# Patient Record
Sex: MALE | Race: WHITE | Employment: FULL TIME | URBAN - METROPOLITAN AREA
[De-identification: names, ages, dates, MRNs, and addresses within clinical notes are randomized per-mention and may not be internally consistent; named-entity substitution may affect disease eponyms.]

---

## 2020-06-23 ENCOUNTER — TELEPHONE (OUTPATIENT)
Dept: UROLOGY | Age: 45
End: 2020-06-23

## 2020-06-24 ENCOUNTER — HOSPITAL ENCOUNTER (OUTPATIENT)
Dept: ULTRASOUND IMAGING | Age: 45
Discharge: HOME | End: 2020-06-26
Payer: COMMERCIAL

## 2020-06-24 PROCEDURE — 76870 US EXAM SCROTUM: CPT

## 2020-06-30 ENCOUNTER — OFFICE VISIT (OUTPATIENT)
Dept: UROLOGY | Age: 45
End: 2020-06-30
Payer: COMMERCIAL

## 2020-06-30 VITALS
DIASTOLIC BLOOD PRESSURE: 76 MMHG | SYSTOLIC BLOOD PRESSURE: 112 MMHG | HEIGHT: 70 IN | WEIGHT: 160 LBS | HEART RATE: 93 BPM | BODY MASS INDEX: 22.9 KG/M2

## 2020-06-30 PROCEDURE — 99203 OFFICE O/P NEW LOW 30 MIN: CPT | Performed by: UROLOGY

## 2020-06-30 NOTE — PROGRESS NOTES
patient face-to-face  No orders of the defined types were placed in this encounter. No orders of the defined types were placed in this encounter. Richy Mueller MD       Please note this report has been partially produced using speech recognition software  And may cause contain errors related to that system including grammar, punctuation and spelling as well as words and phrases that may seem inappropriate. If there are questions or concerns please feel free to contact me to clarify.

## 2022-07-28 ENCOUNTER — HOSPITAL ENCOUNTER (OUTPATIENT)
Dept: PHYSICAL THERAPY | Age: 47
Setting detail: THERAPIES SERIES
Discharge: HOME | End: 2022-07-28
Payer: COMMERCIAL

## 2022-07-28 PROCEDURE — 97110 THERAPEUTIC EXERCISES: CPT

## 2022-07-28 PROCEDURE — 97161 PT EVAL LOW COMPLEX 20 MIN: CPT

## 2022-07-28 ASSESSMENT — PAIN DESCRIPTION - PAIN TYPE: TYPE: ACUTE PAIN

## 2022-07-28 ASSESSMENT — PAIN DESCRIPTION - LOCATION: LOCATION: SHOULDER

## 2022-07-28 ASSESSMENT — PAIN DESCRIPTION - DESCRIPTORS: DESCRIPTORS: SHARP;STABBING

## 2022-07-28 ASSESSMENT — PAIN DESCRIPTION - ORIENTATION: ORIENTATION: LEFT;UPPER

## 2022-07-28 ASSESSMENT — PAIN DESCRIPTION - ONSET: ONSET: SUDDEN

## 2022-07-28 ASSESSMENT — PAIN SCALES - GENERAL: PAINLEVEL_OUTOF10: 4

## 2022-07-28 ASSESSMENT — PAIN DESCRIPTION - FREQUENCY: FREQUENCY: INTERMITTENT

## 2022-07-28 ASSESSMENT — PAIN - FUNCTIONAL ASSESSMENT: PAIN_FUNCTIONAL_ASSESSMENT: PREVENTS OR INTERFERES WITH ALL ACTIVE AND SOME PASSIVE ACTIVITIES

## 2022-07-28 NOTE — PROGRESS NOTES
[address]  PHYSICAL THERAPY PLAN OF CARE   [address], [address] [facility]         Ph: [phone]  Fax: [phone]    [] Certification  [] Recertification [x]  Plan of Care  [] Progress Note [] Discharge      Referring Provider: Prabha Garcia MD     From:  Shahnaz Kevin, PT , DPT  Patient: Kaylene Figueroa [de-identified]50 y.o. male) : 1975 Date: 2022  Medical Diagnosis: Unspecified sprain of unspecified shoulder joint, initial encounter [U20.789J]    Treatment Diagnosis: L shoulder pain, decreased L shoulder A/PROM, decreased L UE strength, decreased posture/shoulder positioning/biomechanics, and decreased functional activity tolerance    Progress Report Period from:  2022  to 2022    Visits to Date: 1 No Show: 0 Cancelled Appts: 0    OBJECTIVE:   Short Term Goals - Time Frame for Short term goals: 3 weeks    Goals Current/Discharge status  Status   Short term goal 1: The pt will demonstrate improved postural awareness requiring <25% VC's with exercises  fair New   Short term goal 2: The pt will demo improved L shoulder PROM WNL's all planes in order to increase ease with active ADL's  PROM LUE (degrees)  L Shoulder Flex  0-170: 137  L Shoulder ABduction 0-140: 105  L Shoulder Int Rotation  0-70: 70  L Shoulder Ext Rotation  0-90: 65 New     Long Term Goals - Time Frame for Long term goals : 6 weeks  Goals Current/ Discharge status Status   Long term goal 1: The pt will demo improved L shoulder AROM WNL's all planes in order to increase ease with ADL's AROM LUE (degrees)  L Shoulder Flexion 0-180: 40 before increased pain  L Shoulder ABduction 0-180: 45 before increased pain  L Shoulder Int Rotation  0-70: L1  L Shoulder Ext Rotation  0-90: ear- unable to reach midline  New   Long term goal 2: The pt will demonstrate improved L UE strength 5/5 in order to lift/carry with decreased pain Strength LUE  L Shoulder Flexion: 2+/5  L Shoulder ABduction: 2+/5  L Shoulder Internal Rotation: 2+/5  L Shoulder External Rotation: 2+/5  L Elbow Flexion: 5/5  L Elbow Extension: 4+/5 (pain)   New   Long term goal 3: The pt will have an increase in UEFI score >/=75/80 points in order to increase functional activity tolerance Exam: UEFI 40/80   New   Long term goal 4: The pt will be indep/compliant with HEP in order to self-manage symptoms upon D/C ongoing New     Body Structures, Functions, Activity Limitations Requiring Skilled Therapeutic Intervention: Increased pain, Decreased ADL status, Decreased ROM, Decreased strength, Decreased high-level IADLs  Assessment: Pt presents with onset of L shoulder pain after falling while running on 7/3/22 landing on his L UE. He currently presents with decreased L shoulder A/PROM, decreased L UE strength, decreased posture/shoulder positioning/biomechanics, and decreased functional activity tolerance. Unable to further assess special tests this date d/t pain, limited mobility, and guarding though pt with recent MRI and states there is no RTC tear. These impairments currently limit his fucntional abilities to perform ADL's, household duties, work related duties, overhead activities, reach, lift, carry, or sleep without increased pain or limitations at PLOF. Therapy Prognosis: Excellent    PT Education: Goals;PT Role;Plan of Care;Evaluative findings;Home Exercise Program;Anatomy of condition    PLAN: [x] Evaluate and Treat  Frequency/Duration:  Plan Frequency: 2-3  Plan weeks: 6  Current Treatment Recommendations: Strengthening, ROM, Manual Therapy - Soft Tissue Mobilization, Neuromuscular re-education, Manual Therapy - Joint Manipulation, Pain management, Home exercise program, Return to work related activity, Patient/Caregiver education & training, Modalities, Positioning, Integrated dry needling     Precautions: C6-C7 Fusion                       Patient Status:[x] Continue/ Initiate plan of Care    [] Discharge PT. Recommend pt continue with HEP.      [] Additional visits requested, Please re-certify for additional visits:    [] Hold         Signature: Electronically signed by Arslan Goldberg PT on 7/28/22 at 9:08 AM EDT    If you have any questions or concerns, please don't hesitate to call. Thank you for your referral.    I have reviewed this plan of care and certify a need for medically necessary rehabilitation services.     Physician Signature:__________________________________________________________  Date:  Please sign and return

## 2022-07-28 NOTE — PROGRESS NOTES
[facility]  PHYSICAL THERAPY EVALUATION      Physical Therapy: Initial Evaluation    Patient: aFvian Santiago [de-identified]50 y.o.     male)   Examination Date:   Plan of Care Certification Period: 2022 to    Progress Note Counter:    :  1975 ;    Confirmed: Yes MRN: [id number]  CSN: [id number]   Insurance: Payor: [facility] / Plan: [facility] - [place] PPO / Product Type: *No Product type* /   Insurance ID: [id number] - [facility] Secondary Insurance (if applicable):    Referring Physician: Maximo Stack MD     PCP: No primary care provider on file. Visits to Date/Visits Approved:     No Show/Cancelled Appts: 0      Medical Diagnosis: Unspecified sprain of unspecified shoulder joint, initial encounter [S43.409A]    Treatment Diagnosis: L shoulder pain, decreased L shoulder A/PROM, decreased L UE strength, decreased posture/shoulder positioning/biomechanics, and decreased functional activity tolerance     PERTINENT MEDICAL HISTORY   Patient Assessed for Rehabilitation Services: Yes       Medical History: Chart Reviewed: Yes No past medical history on file. Surgical History:   Past Surgical History:   Procedure Laterality Date    THORACIC SPINE SURGERY      C7       Medications: No current outpatient medications on file. Allergies: Patient has no known allergies. SUBJECTIVE EXAMINATION      ,      Family/Caregiver Present: No    Subjective History: Onset Date: 22  Subjective: Pt reports he was running a race on 7/3/22 and fell landing on his L UE resulting in L shoulder pain and multiple abrasions throughout L UE. Went to the ER the following day d/t shoulder pain. Had xrays taken. Saw ortho who took MRI and said there is \"fluid\" around L shoulder, reports (-) for RTC tear. Ortho referred him to PT and takes motrin PRN. Pt reports diffciulty lifting L UE without \"severe\" pain.  Pt reports he was in a sling and that helps to alleviate the pain though he has been trying to go witout it as much as tolerated.   Additional Pertinent Hx (if applicable): PMH O6-S8 fusion   Prior diagnostic testing[de-identified] MRI, X-ray  Previous treatments prior to current episode?: Medications  Comment: RTD unsure      Learning/Language: Learning  Does the patient/guardian have any barriers to learning?: No barriers  Will there be a co-learner?: No  What is the preferred language of the patient/guardian?: English  Is an  required?: No     Pain Screening    Pain Screening  Patient Currently in Pain: Yes  Pain Assessment: 0-10  Pain Level: 4  Best Pain Level: 0  Worst Pain Level: 10  Pain Type: Acute pain  Pain Location: Shoulder  Pain Orientation: Left, Upper  Pain Descriptors: Sharp, Stabbing  Pain Frequency: Intermittent  Pain Onset: Sudden  Functional Pain Assessment: Prevents or interferes with all active and some passive activities  Aggravating factors: Reaching, Movement, Sitting, Laying on involved side, Sleeping, Lifting, Carrying, Supine lying  Pain Management/Relieving Factors: Medications, Ice, Heat    Functional Status    Social History:    Social History  Lives With: Spouse    Occupation/Interests:   Occupation: Full time employment  Type of Occupation: [employer] sons [employer] not physically working  Leisure & Hobbies: running, golfing, bowling    Prior Level of Function:   100% Independent      Current Level of Function:   </=20%      IADL Comments: increased time required to perform bathing and dressing  ADL Assistance: Independent  Homemaking Assistance: Independent  Homemaking Responsibilities: Yes  Ambulation Assistance: Independent  Transfer Assistance: Independent  Active : Yes    Dominant Hand: : Right    OBJECTIVE EXAMINATION     Review of Systems:  Follows Commands: Within Functional Limits    Observations:   General Observations  Description: rounded shoulders, L shoulder elavation, scap winging, scap abduction    Left AROM Right AROM         AROM LUE (degrees)  L Shoulder Flexion 0-180: 40 before increased pain  L Shoulder ABduction 0-180: 45 before increased pain  L Shoulder Int Rotation  0-70: L1  L Shoulder Ext Rotation  0-90: ear- unable to reach midline    AROM RUE (degrees)  R Shoulder Flexion 0-180: 170  R Shoulder ABduction 0-180: 170  R Shoulder Int Rotation  0-70: T10  R Shoulder Ext Rotation 0-90: T5      Left PROM  Right PROM         PROM LUE (degrees)  L Shoulder Flex  0-170: 137  L Shoulder ABduction 0-140: 105  L Shoulder Int Rotation  0-70: 70  L Shoulder Ext Rotation  0-90: 65           Left Strength  Right Strength         Strength LUE  L Shoulder Flexion: 2+/5  L Shoulder ABduction: 2+/5  L Shoulder Internal Rotation: 2+/5  L Shoulder External Rotation: 2+/5  L Elbow Flexion: 5/5  L Elbow Extension: 4+/5 (pain)    Strength RUE  R Shoulder Flexion: 5/5  R Shoulder ABduction: 5/5  R Shoulder Internal Rotation: 5/5  R Shoulder External Rotation: 5/5  R Elbow Flexion: 5/5  R Elbow Extension: 5/5     Outcomes Score:  Exam: UEFI 40/80    Treatment:    Exercises:   Exercises  Exercise 1: pendulums 4 way x10 ea. Exercise 2: pulleys*  Exercise 3: table slides*  Exercise 4: scap retract*  Exercise 5: supine wand*  Exercise 7: PROM: L shoulder flex, abd, ER x10 mins  Exercise 8: HEP: pendulums  Treatment Reasoning  Limitations addressed: Mobility  Modalities:  Modalities  Modalities:  (MHP/CP/U/S/IFC PRN*)     Manual:  Manual Therapy  Joint Mobilization: GH and scapular mobs*  Soft Tissue Mobilizaton: L periscapulars, LS, lats, bicep*     *Indicates exercise,modality, or manual techniques to be initiated when appropriate    ASSESSMENT     Impression: Assessment: Pt presents with onset of L shoulder pain after falling while running on 7/3/22 landing on his L UE.  He currently presents with decreased L shoulder A/PROM, decreased L UE strength, decreased posture/shoulder positioning/biomechanics, and decreased functional activity TREATMENT PLAN       Requires PT Follow-Up: Yes  Treatment Initiated : ther ex, PROM, HEP    Treatment may include any combination of the following: Strengthening, ROM, Manual Therapy - Soft Tissue Mobilization, Neuromuscular re-education, Manual Therapy - Joint Manipulation, Pain management, Home exercise program, Return to work related activity, Patient/Caregiver education & training, Modalities, Positioning, Integrated dry needling     Frequency / Duration:  Patient to be seen 2-3 times per week for 6 weeks      Eval Complexity:   Decision Making: Low Complexity  History: Personal Factors and/or Comorbidities Impacting POC: Low  Examination of body system(s) including body structures and functions, activity limitations, and/or participation restrictions: High  Exam: UEFI 40/80  Clinical Presentation: Medium    POST-PAIN     Pain Rating (0-10 pain scale):  5 /10  Location and pain description same as pre-treatment unless indicated. Action: [] NA  [] Call Physician  [x] Perform HEP  [x] Meds as prescribed    Evaluation and patient rights have been reviewed and patient agrees with plan of care. Yes  [x]  No  []   Explain:     Paresh Fall Risk Assessment  Risk Factor Scale  Score   History of Falls [x] Yes  [] No 25  0 25   Secondary Diagnosis [] Yes  [x] No 15  0    Ambulatory Aid [] Furniture  [] Crutches/cane/walker  [x] None/bedrest/wheelchair/nurse 30  15  0    IV/Heparin Lock [] Yes  [x] No 20  0    Gait/Transferring [] Impaired  [] Weak  [x] Normal/bedrest/immobile 20  10  0    Mental Status [] Forgets limitations  [x] Oriented to own ability 15  0       Total: 25     Based on the Assessment score: check the appropriate box.   []  No intervention needed   Low =   Score of 0-24  [x]  Use standard prevention interventions Moderate =  Score of 24-44   [x] Discuss fall prevention strategies   [x] Indicate moderate falls risk on eval  []  Use high risk prevention interventions High = Score of 45 and higher   [] Discuss fall prevention strategies   [] Provide supervision during treatment time      Minutes:  PT Individual Minutes  Time In: 0815  Time Out: 9895  Minutes: 34  Timed Code Treatment Minutes: 15 Minutes  Procedure Minutes: 19     Timed Activity Minutes Units   Ther Ex 15 1     Electronically signed by Lynne Sexton PT on 7/28/22 at 9:12 AM EDT

## 2022-08-01 ENCOUNTER — HOSPITAL ENCOUNTER (OUTPATIENT)
Dept: PHYSICAL THERAPY | Age: 47
Setting detail: THERAPIES SERIES
Discharge: HOME | End: 2022-08-01
Payer: COMMERCIAL

## 2022-08-01 PROCEDURE — 97110 THERAPEUTIC EXERCISES: CPT

## 2022-08-01 ASSESSMENT — PAIN SCALES - GENERAL: PAINLEVEL_OUTOF10: 6

## 2022-08-01 ASSESSMENT — PAIN DESCRIPTION - ORIENTATION: ORIENTATION: LEFT;UPPER

## 2022-08-01 ASSESSMENT — PAIN DESCRIPTION - DESCRIPTORS: DESCRIPTORS: SHARP;STABBING

## 2022-08-01 ASSESSMENT — PAIN DESCRIPTION - FREQUENCY: FREQUENCY: INTERMITTENT

## 2022-08-01 ASSESSMENT — PAIN DESCRIPTION - LOCATION: LOCATION: SHOULDER

## 2022-08-01 ASSESSMENT — PAIN DESCRIPTION - PAIN TYPE: TYPE: ACUTE PAIN

## 2022-08-01 NOTE — PROGRESS NOTES
[address] [facility]  Outpatient Physical Therapy    Treatment Note        Date: 2022  Patient: Matteo Weaver  : 1975   Confirmed: Yes  MRN: [id number]  Referring Provider: Sho Sousa MD   Secondary Referring Provider (If applicable):     Medical Diagnosis: Unspecified sprain of unspecified shoulder joint, initial encounter [P45.927Y]    Treatment Diagnosis: L shoulder pain, decreased L shoulder A/PROM, decreased L UE strength, decreased posture/shoulder positioning/biomechanics, and decreased functional activity tolerance    Visit Information:  Insurance: Payor: [facility] / Plan: [employer] - [place] PPO / Product Type: *No Product type* /   PT Visit Information  Onset Date: 22  Total # of Visits Approved: 75  Total # of Visits to Date: 2  No Show: 0  Canceled Appointment: 0  Progress Note Counter: -    Subjective Information:  Subjective: Pt reports throbbing pain when waking up pt believes it is dt being a wild sleeper. reports occasionally rolls on while sleeping. inquired about best way to prevent. pt reports pain alleviates over time dropping to 1-2 which is managed with moltrin  HEP Compliance:  [x] Good [] Fair [] Poor [] Reports not doing due to:    Pain Screening  Patient Currently in Pain: Yes  Pain Level: 6  Pain Type: Acute pain  Pain Location: Shoulder  Pain Orientation: Left, Upper  Pain Descriptors: Sharp, Stabbing  Pain Frequency: Intermittent    Treatment:  Exercises:  Exercises  Exercise 1: pendulums 4 way x10 ea. Exercise 2: pulleys 4' flexion  Exercise 3: table slides x 10  Exercise 4: scap retract x 15 x 5\" holds increased cuing to remove shoulder muscle activation. Exercise 5: supine wand Flexion  Exercise 7: PROM: L shoulder flex, abd, ER x10 mins  Exercise 8: HEP: pendulums  Treatment Reasoning  Limitations addressed:  Mobility    *Indicates exercise, modality, or manual techniques to be initiated when appropriate    Objective Measures:       Strength: [x] NT  [] MMT completed:      ROM: [x] NT  [] ROM measurements:    Balance: [x] NT       Observations: [x] NT       Assessment: Body Structures, Functions, Activity Limitations Requiring Skilled Therapeutic Intervention: Increased pain, Decreased ADL status, Decreased ROM, Decreased strength, Decreased high-level IADLs  Assessment: PT reports with increased shoulder pain this morning dt sleep but was able to tolerate newly introduced activites. pt reports occasional pain in the posterior shoulder cued to stop  just before pain pt demo'd fair to good carry over. Pt requires cuing for staying within pain free range. Pt reported significant improvement in pain following tx Educated pt on positional awareness while in bed and placing self in positions to inhibit rolling onto shoulder look to discuss further NV  Treatment Diagnosis: L shoulder pain, decreased L shoulder A/PROM, decreased L UE strength, decreased posture/shoulder positioning/biomechanics, and decreased functional activity tolerance  Therapy Prognosis: Excellent       Patient Education: [x] See above     Post-Pain Assessment:       Pain Rating (0-10 pain scale):   1/10   Location and pain description same as pre-treatment unless indicated. Action: [] NA   [x] Perform HEP  [] Meds as prescribed  [] Modalities as prescribed   [] Call Physician     GOALS   Patient Goal(s): Patient goals :  \"To stop the pain\"    Short Term Goals Completed by 3 weeks Goal Status   STG 1 The pt will demonstrate improved postural awareness requiring <25% VC's with exercises In progress   STG 2 The pt will demo improved L shoulder PROM WNL's all planes in order to increase ease with active ADL's In progress     Long Term Goals Completed by 6 weeks Goal Status   LTG 1 The pt will demo improved L shoulder AROM WNL's all planes in order to increase ease with ADL's In progress   LTG 2 The pt will demonstrate improved L UE strength 5/5 in order to lift/carry with decreased pain In progress LTG 3 The pt will have an increase in UEFI score >/=75/80 points in order to increase functional activity tolerance In progress   LTG 4 The pt will be indep/compliant with HEP in order to self-manage symptoms upon D/C In progress            Plan:  Frequency/Duration:  Plan  Plan Frequency: 2-3  Plan weeks: 6  Current Treatment Recommendations: Strengthening, ROM, Manual Therapy - Soft Tissue Mobilization, Neuromuscular re-education, Manual Therapy - Joint Manipulation, Pain management, Home exercise program, Return to work related activity, Patient/Caregiver education & training, Modalities, Positioning, Integrated dry needling  Pt to continue current HEP. See objective section for any therapeutic exercise changes, additions or modifications this date.     Therapy Time:      PT Individual Minutes  Time In: 0900  Time Out: 3807  Minutes: 55     Procedure Minutes: 0  Timed Activity Minutes Units   Ther Ex 55 4     Electronically signed by Leon Helton PTA on 8/1/22 at 9:58 AM EDT

## 2022-08-03 ENCOUNTER — HOSPITAL ENCOUNTER (OUTPATIENT)
Dept: PHYSICAL THERAPY | Age: 47
Setting detail: THERAPIES SERIES
Discharge: HOME | End: 2022-08-03
Payer: COMMERCIAL

## 2022-08-03 PROCEDURE — 97110 THERAPEUTIC EXERCISES: CPT

## 2022-08-03 ASSESSMENT — PAIN DESCRIPTION - ORIENTATION: ORIENTATION: LEFT

## 2022-08-03 ASSESSMENT — PAIN DESCRIPTION - LOCATION: LOCATION: SHOULDER

## 2022-08-03 ASSESSMENT — PAIN SCALES - GENERAL: PAINLEVEL_OUTOF10: 6

## 2022-08-03 ASSESSMENT — PAIN DESCRIPTION - DESCRIPTORS: DESCRIPTORS: SORE

## 2022-08-03 NOTE — PROGRESS NOTES
[address] [facility]  Outpatient Physical Therapy    Treatment Note        Date: 8/3/2022  Patient: Raul Weaver  : 1975   Confirmed: Yes  MRN: [id number]  Referring Provider: Hamzah Salas MD   Secondary Referring Provider (If applicable):     Medical Diagnosis: Unspecified sprain of unspecified shoulder joint, initial encounter [L59.609S]    Treatment Diagnosis: L shoulder pain, decreased L shoulder A/PROM, decreased L UE strength, decreased posture/shoulder positioning/biomechanics, and decreased functional activity tolerance    Visit Information:  Insurance: Payor: Maury Garcia / Plan: [facility] - [place] PPO / Product Type: *No Product type* /   PT Visit Information  Onset Date: 22  Total # of Visits Approved: 75  Total # of Visits to Date: 3  No Show: 0  Canceled Appointment: 0  Progress Note Counter: 3/8-    Subjective Information:  Subjective: Pt reports feeling very sore this AM, reports it is sore every morning. Felt \"pretty good\" following LV. HEP Compliance:  [x] Good [] Fair [] Poor [] Reports not doing due to:    Pain Screening  Patient Currently in Pain: Yes  Pain Assessment: 0-10  Pain Level: 6  Pain Location: Shoulder  Pain Orientation: Left  Pain Descriptors: Sore    Treatment:  Exercises:  Exercises  Exercise 2: pulleys 4' flexion  Exercise 3: table slides 5\" x 10  Exercise 4: scap retract x 15 x 5\"  Exercise 5: supine wand Flexion, ER, scaption 5\"x10  Exercise 6: L UT stretch 30\"x3  Exercise 7: PROM: L shoulder flex, abd, ER, IR (ER/IR in varying degrees of abduction with humeral head blocking) x10 mins  Exercise 8: HEP: table slides, UT stretch  Treatment Reasoning  Limitations addressed:  Mobility, Posture    Manual:   Manual Therapy  Joint Mobilization: GH inferior joint mobs grade II-III at end range abduction, long axis pdistraction throughout PROM in all planes     Modalities:  Declined, will perform at home     *Indicates exercise, modality, or manual techniques to be initiated when appropriate    Objective Measures:     Strength: [x] NT  [] MMT completed:      ROM: [] NT  [x] ROM measurements:  AROM LUE (degrees)  L Shoulder Flexion 0-180: 150 with painful arc from 0-45 degrees and pain at end range  L Shoulder ABduction 0-180: 75 pain from 45-75degrees  L Shoulder Ext Rotation  0-90: base of occiput with pain     Balance: [x] NT     Observations: [x] NT     Assessment: Body Structures, Functions, Activity Limitations Requiring Skilled Therapeutic Intervention: Increased pain, Decreased ADL status, Decreased ROM, Decreased strength, Decreased high-level IADLs  Assessment: Pt progressing well towards L shoulder AROM despite having ongoing pain espeically at initiation of motions. Pt demos near full PROM in flexion/abduction as well as improving IR/ER with firm end feels in varying degrees of abduction. Pt conts to require VC's to perform exercises in non-pain producing ROM with good follow through. Pt also required intermittent VC/TC's to decrease UT compensations with good carryover. Treatment Diagnosis: L shoulder pain, decreased L shoulder A/PROM, decreased L UE strength, decreased posture/shoulder positioning/biomechanics, and decreased functional activity tolerance  Therapy Prognosis: Excellent     Patient Education: [x] NA     Post-Pain Assessment:       Pain Rating (0-10 pain scale):   3/10   Location and pain description same as pre-treatment unless indicated. Action: [] NA   [x] Perform HEP  [] Meds as prescribed  [] Modalities as prescribed   [] Call Physician     GOALS   Patient Goal(s): Patient goals :  \"To stop the pain\"    Short Term Goals Completed by 3 weeks Goal Status   STG 1 The pt will demonstrate improved postural awareness requiring <25% VC's with exercises In progress   STG 2 The pt will demo improved L shoulder PROM WNL's all planes in order to increase ease with active ADL's In progress     Long Term Goals Completed by 6 weeks Goal Status   LTG 1 The pt will demo improved L shoulder AROM WNL's all planes in order to increase ease with ADL's In progress   LTG 2 The pt will demonstrate improved L UE strength 5/5 in order to lift/carry with decreased pain In progress   LTG 3 The pt will have an increase in UEFI score >/=75/80 points in order to increase functional activity tolerance In progress   LTG 4 The pt will be indep/compliant with HEP in order to self-manage symptoms upon D/C In progress     Plan:  Frequency/Duration:  Plan  Plan Frequency: 2-3  Plan weeks: 6  Current Treatment Recommendations: Strengthening, ROM, Manual Therapy - Soft Tissue Mobilization, Neuromuscular re-education, Manual Therapy - Joint Manipulation, Pain management, Home exercise program, Return to work related activity, Patient/Caregiver education & training, Modalities, Positioning, Integrated dry needling  Pt to continue current HEP. See objective section for any therapeutic exercise changes, additions or modifications this date.     Therapy Time:   PT Individual Minutes  Time In: 4582  Time Out: 4680  Minutes: 40  Timed Code Treatment Minutes: 40 Minutes  Procedure Minutes: 0  Timed Activity Minutes Units   Ther Ex 40 3     Electronically signed by Irais Rosado PT on 8/3/22 at 8:46 AM EDT

## 2022-08-05 ENCOUNTER — HOSPITAL ENCOUNTER (OUTPATIENT)
Dept: PHYSICAL THERAPY | Age: 47
Setting detail: THERAPIES SERIES
Discharge: HOME | End: 2022-08-05
Payer: COMMERCIAL

## 2022-08-05 PROCEDURE — 97110 THERAPEUTIC EXERCISES: CPT | Performed by: PHYSICAL THERAPIST

## 2022-08-05 ASSESSMENT — PAIN DESCRIPTION - LOCATION: LOCATION: SHOULDER

## 2022-08-05 ASSESSMENT — PAIN SCALES - GENERAL: PAINLEVEL_OUTOF10: 6

## 2022-08-05 ASSESSMENT — PAIN DESCRIPTION - ORIENTATION: ORIENTATION: LEFT

## 2022-08-05 ASSESSMENT — PAIN DESCRIPTION - DESCRIPTORS: DESCRIPTORS: SORE

## 2022-08-05 NOTE — PROGRESS NOTES
[address] [facility]  Outpatient Physical Therapy    Treatment Note        Date: 2022  Patient: Suzanne Weaver  : 1975   Confirmed: Yes  MRN: [id number]  Referring Provider: Liborio Becerra MD   Secondary Referring Provider (If applicable):     Medical Diagnosis: Unspecified sprain of unspecified shoulder joint, initial encounter [F84.632Y]    Treatment Diagnosis: L shoulder pain, decreased L shoulder A/PROM, decreased L UE strength, decreased posture/shoulder positioning/biomechanics, and decreased functional activity tolerance    Visit Information:  Insurance: Payor: Lila [place] / Plan: [employer] - [place] PPO / Product Type: *No Product type* /   PT Visit Information  Onset Date: 22  PT Insurance Information: [employer]  Total # of Visits Approved: 75  Total # of Visits to Date: 4  No Show: 0  Canceled Appointment: 0  Progress Note Counter: -    Subjective Information:  Subjective: Patient reports that his shoulder is more painful in the mornings, and feels better after therapy and throughout his day while moving his shoulder. HEP Compliance:  [x] Good [] Fair [] Poor [] Reports not doing due to:    Pain Screening  Patient Currently in Pain: Yes  Pain Assessment: 0-10  Pain Level: 6  Best Pain Level: 0  Worst Pain Level: 10  Post Treatment Pain Level: 3  Pain Location: Shoulder  Pain Orientation: Left  Pain Descriptors: Sore    Treatment:  Exercises:  Exercises  Exercise 1: pendulums 4 way x10 ea. Exercise 2: pulleys 4' flexion  Exercise 3: table slides 5\" x 10  Exercise 4: scap retract x 15 x 5\"  Exercise 5: supine wand Flexion, ER, scaption 5\"x10  Exercise 6: L UT stretch 30\"x3  Exercise 7: PROM: L shoulder flex, abd, ER, IR (ER/IR in varying degrees of abduction with humeral head blocking) x10 mins  Exercise 9: corner stretch 3 times 20 sec hold  Treatment Reasoning  Limitations addressed:  Mobility, Posture  Therapist provided: Verbal cuing       *Indicates exercise, modality, or manual techniques to be initiated when appropriate    Objective Measures:    Strength: [x] NT  [] MMT completed:        ROM: [x] NT  [] ROM measurements:         AROM RUE (degrees)  R Shoulder Flexion 0-180: 170  R Shoulder ABduction 0-180: 170  R Shoulder Int Rotation  0-70: T10  R Shoulder Ext Rotation 0-90: T5             Assessment: Body Structures, Functions, Activity Limitations Requiring Skilled Therapeutic Intervention: Increased pain, Decreased ADL status, Decreased ROM, Decreased strength, Decreased high-level IADLs  Assessment: Patient is progressing with his program, still reports sharp pain with abduction at 90 degrees. Having difficulty with sleeping due to pain. Treatment Diagnosis: L shoulder pain, decreased L shoulder A/PROM, decreased L UE strength, decreased posture/shoulder positioning/biomechanics, and decreased functional activity tolerance  Therapy Prognosis: Excellent  Activity Tolerance  Activity Tolerance: Patient tolerated treatment well    Patient Education: [] NA  PT Education: Home Exercise Program  Patient Education: Ice to shoulder to relieve pain    Post-Pain Assessment:       Pain Rating (0-10 pain scale):   3/10   Location and pain description same as pre-treatment unless indicated. Action: [] NA   [x] Perform HEP  [] Meds as prescribed  [] Modalities as prescribed   [] Call Physician     GOALS   Patient Goal(s): Patient goals :  \"To stop the pain\"    Short Term Goals Completed by 3 weeks Goal Status   STG 1 The pt will demonstrate improved postural awareness requiring <25% VC's with exercises In progress   STG 2 The pt will demo improved L shoulder PROM WNL's all planes in order to increase ease with active ADL's Met   STG 3       STG 4       STG 5           Long Term Goals Completed by 6 weeks Goal Status   LTG 1 The pt will demo improved L shoulder AROM WNL's all planes in order to increase ease with ADL's In progress   LTG 2 The pt will demonstrate improved L UE strength 5/5 in order to lift/carry with decreased pain In progress   LTG 3 The pt will have an increase in UEFI score >/=75/80 points in order to increase functional activity tolerance In progress   LTG 4 The pt will be indep/compliant with HEP in order to self-manage symptoms upon D/C In progress   LTG 5       LTG 6                Plan:  Frequency/Duration:  Plan  Plan Frequency: 2-3  Plan weeks: 6  Current Treatment Recommendations: Strengthening, ROM, Manual Therapy - Soft Tissue Mobilization, Neuromuscular re-education, Manual Therapy - Joint Manipulation, Pain management, Home exercise program, Return to work related activity, Patient/Caregiver education & training, Modalities, Positioning, Integrated dry needling  Pt to continue current HEP. See objective section for any therapeutic exercise changes, additions or modifications this date.     Therapy Time:      PT Individual Minutes  Time In: 1822  Time Out: 0840  Minutes: 34  Timed Code Treatment Minutes: 34 Minutes  Procedure Minutes: 0  Timed Activity Minutes Units   Ther Ex 34 2   Manual        Electronically signed by Lisa Martinez PT on 8/5/22 at 8:49 AM EDT

## 2022-08-08 ENCOUNTER — HOSPITAL ENCOUNTER (OUTPATIENT)
Dept: PHYSICAL THERAPY | Age: 47
Setting detail: THERAPIES SERIES
Discharge: HOME | End: 2022-08-08
Payer: COMMERCIAL

## 2022-08-08 PROCEDURE — 97110 THERAPEUTIC EXERCISES: CPT

## 2022-08-08 ASSESSMENT — PAIN DESCRIPTION - PAIN TYPE: TYPE: ACUTE PAIN

## 2022-08-08 ASSESSMENT — PAIN DESCRIPTION - ORIENTATION: ORIENTATION: LEFT

## 2022-08-08 ASSESSMENT — PAIN SCALES - GENERAL: PAINLEVEL_OUTOF10: 2

## 2022-08-08 ASSESSMENT — PAIN DESCRIPTION - DESCRIPTORS: DESCRIPTORS: SORE

## 2022-08-08 ASSESSMENT — PAIN DESCRIPTION - LOCATION: LOCATION: SHOULDER

## 2022-08-08 NOTE — PROGRESS NOTES
[address] [facility]  Outpatient Physical Therapy    Treatment Note        Date: 2022  Patient: Alaina Hodgkin  : 1975   Confirmed: Yes  MRN: [id number]  Referring Provider: Annabelle Jackson MD  Secondary Referring Provider (If applicable):     Medical Diagnosis: Unspecified sprain of unspecified shoulder joint, initial encounter [N04.217S]    Treatment Diagnosis: L shoulder pain, decreased L shoulder A/PROM, decreased L UE strength, decreased posture/shoulder positioning/biomechanics, and decreased functional activity tolerance    Visit Information:  Insurance: Payor: [employer] / Plan: [facility] - [place] PPO / Product Type: *No Product type* /   PT Visit Information  Onset Date: 22  Total # of Visits Approved: 75  Total # of Visits to Date: 5  No Show: 0  Canceled Appointment: 0  Progress Note Counter: -    Subjective Information:  Subjective: Patient reports that his shoulder was overhead while sleeping on 22 and woke up with intense burning sensation that alleviated with time ( 1 hour time frame of burning then reducing to a sore sensation which has been less over the pass few days.)  HEP Compliance:  [x] Good [] Fair [] Poor [] Reports not doing due to:    Pain Screening  Patient Currently in Pain: Yes  Pain Level: 2  Pain Type: Acute pain  Pain Location: Shoulder  Pain Orientation: Left  Pain Descriptors: Sore    Treatment:  Exercises:  Exercises  Exercise 1: pendulums 4 way x10 ea. Exercise 2: pulleys 4' flexion  Exercise 3: table slides 5\" x 10  Exercise 4: scap retract x 15 x 5\"  Exercise 5: supine wand Flexion, ER,  Standing scaption 5\"x10  Exercise 6: L UT stretch 30\"x3  Exercise 7: PROM: L shoulder flex, abd, ER, IR (ER/IR in varying degrees of abduction with humeral head blocking) x10 mins  Exercise 9: corner stretch 3 times 30 sec hold  Treatment Reasoning  Limitations addressed:  Mobility, Posture  Therapist provided: Verbal cuing      *Indicates exercise, modality, or manual techniques to be initiated when appropriate    Objective Measures:       Strength: [x] NT  [] MMT completed:      ROM: [] NT  [x] ROM measurements:     AROM LUE (degrees)  L Shoulder Flexion 0-180: 170  L Shoulder ABduction 0-180: 170  L Shoulder Int Rotation  0-70: T10  L Shoulder Ext Rotation  0-90: T5       Balance: [x] NT       Observations: [x] NT       Assessment: Body Structures, Functions, Activity Limitations Requiring Skilled Therapeutic Intervention: Increased pain, Decreased ADL status, Decreased ROM, Decreased strength, Decreased high-level IADLs  Assessment: Pt demonstrates improved Pain free range with transient pain at about 70° of flexion that alleviates until endrange of 140-150° at this time educated pt on importance of listening to body and different pain sensations . pt verbalized understanding. . pt is showing increased pain free range of motion and feeling better overall with decreases in pain upon arrival and little to no agitation throughout tx. Treatment Diagnosis: L shoulder pain, decreased L shoulder A/PROM, decreased L UE strength, decreased posture/shoulder positioning/biomechanics, and decreased functional activity tolerance  Therapy Prognosis: Excellent       Patient Education: [x] See above        Post-Pain Assessment:       Pain Rating (0-10 pain scale):   2 /10   Location and pain description same as pre-treatment unless indicated. Action: [] NA   [x] Perform HEP  [] Meds as prescribed  [] Modalities as prescribed   [] Call Physician     GOALS   Patient Goal(s): Patient goals :  \"To stop the pain\"    Short Term Goals Completed by 3 weeks Goal Status   STG 1 The pt will demonstrate improved postural awareness requiring <25% VC's with exercises In progress   STG 2 The pt will demo improved L shoulder PROM WNL's all planes in order to increase ease with active ADL's Met     Long Term Goals Completed by 6 weeks Goal Status   LTG 1 The pt will demo improved L shoulder AROM WNL's all planes in order to increase ease with ADL's In progress   LTG 2 The pt will demonstrate improved L UE strength 5/5 in order to lift/carry with decreased pain In progress   LTG 3 The pt will have an increase in UEFI score >/=75/80 points in order to increase functional activity tolerance In progress   LTG 4 The pt will be indep/compliant with HEP in order to self-manage symptoms upon D/C In progress            Plan:  Frequency/Duration:  Plan  Plan Frequency: 2-3  Plan weeks: 6  Current Treatment Recommendations: Strengthening, ROM, Manual Therapy - Soft Tissue Mobilization, Neuromuscular re-education, Manual Therapy - Joint Manipulation, Pain management, Home exercise program, Return to work related activity, Patient/Caregiver education & training, Modalities, Positioning, Integrated dry needling  Pt to continue current HEP. See objective section for any therapeutic exercise changes, additions or modifications this date.     Therapy Time:      PT Individual Minutes  Time In: 0900  Time Out: 4506  Minutes: 41  Timed Code Treatment Minutes: 41 Minutes  Procedure Minutes: 0  Timed Activity Minutes Units   Ther Ex 41 3     Electronically signed by Margareth Celaya PTA on 8/8/22 at 9:54 AM EDT  Addendum to clarify my conflicting documentation within this encounter  Correction: Location of measurements  Margareth Celaya PTA

## 2022-08-10 ENCOUNTER — HOSPITAL ENCOUNTER (OUTPATIENT)
Dept: PHYSICAL THERAPY | Age: 47
Setting detail: THERAPIES SERIES
Discharge: HOME | End: 2022-08-10
Payer: COMMERCIAL

## 2022-08-10 PROCEDURE — 97110 THERAPEUTIC EXERCISES: CPT

## 2022-08-10 ASSESSMENT — PAIN DESCRIPTION - LOCATION: LOCATION: SHOULDER

## 2022-08-10 ASSESSMENT — PAIN DESCRIPTION - DESCRIPTORS: DESCRIPTORS: SORE

## 2022-08-10 ASSESSMENT — PAIN SCALES - GENERAL: PAINLEVEL_OUTOF10: 5

## 2022-08-10 ASSESSMENT — PAIN DESCRIPTION - PAIN TYPE: TYPE: ACUTE PAIN

## 2022-08-10 ASSESSMENT — PAIN DESCRIPTION - ORIENTATION: ORIENTATION: LEFT

## 2022-08-10 NOTE — PROGRESS NOTES
[address] [place]  Outpatient Physical Therapy    Treatment Note        Date: 8/10/2022  Patient: Vicki Tipton  : 1975   Confirmed: Yes  MRN: [id number]  Referring Provider: Kristie Singh MD  Secondary Referring Provider (If applicable):     Medical Diagnosis: Unspecified sprain of unspecified shoulder joint, initial encounter [U57.422V]    Treatment Diagnosis: L shoulder pain, decreased L shoulder A/PROM, decreased L UE strength, decreased posture/shoulder positioning/biomechanics, and decreased functional activity tolerance    Visit Information:  Insurance: Payor: [employer] / Plan: [employer] - [place] PPO / Product Type: *No Product type* /   PT Visit Information  Onset Date: 22  Total # of Visits Approved: 75  Total # of Visits to Date: 6  No Show: 0  Canceled Appointment: 0  Progress Note Counter: -    Subjective Information:  Subjective: In the morning the pain is the worse. I think it has to do with the way I sleep. HEP Compliance:  [x] Good [] Fair [] Poor [] Reports not doing due to:    Pain Screening  Patient Currently in Pain: Yes  Pain Assessment: 0-10  Pain Level: 5  Best Pain Level: 1  Worst Pain Level: 6  Pain Type: Acute pain  Pain Location: Shoulder  Pain Orientation: Left  Pain Descriptors: Sore    Treatment:  Exercises:  Exercises  Exercise 1: pendulums with 1# 4 way x10 ea. Exercise 2: pulleys 2' flexion / 2' scaption. Exercise 3: table slides flexion/scaption  5\" x 10  Exercise 5: supine wand 2# Flexion, ER,  Standing scaption 5\"x10  Exercise 7: PROM: L shoulder flex, abd, ER, IR (ER/IR in varying degrees of abduction with humeral head blocking) x10 mins  Exercise 8: UBE L 1.0 gentle 2' Fwd / 2' Back  Exercise 20: HEP: table slides, UT stretch         Objective Measures:          Strength: [x] NT  [] MMT completed:       ROM: [x] NT  [] ROM measurements:       Balance: [x] NT       Observations: [x] NT       Assessment:    Body Structures, Functions, Activity Limitations Requiring Skilled Therapeutic Intervention: Increased pain, Decreased ADL status, Decreased ROM, Decreased strength, Decreased high-level IADLs  Assessment: Introduction of gentle resistance with addition of weighted bar with supine exercises and addition of UBE left at Level 1.0 today with focus on working within comfortable ranges, educated pt on progression of exercises with focus on avoiding high intense pain levels. Pain decreased today with tx session. Will plan to continue with gentle progressions as tolerated. Pt to monitor pain and sxs following this tx session and report at next appointment to determine if progressions appropriate. Treatment Diagnosis: L shoulder pain, decreased L shoulder A/PROM, decreased L UE strength, decreased posture/shoulder positioning/biomechanics, and decreased functional activity tolerance  Therapy Prognosis: Excellent       Patient Education: [x] NA       Post-Pain Assessment:       Pain Rating (0-10 pain scale):   3/10   Location and pain description same as pre-treatment unless indicated. Action: [] NA   [x] Perform HEP  [] Meds as prescribed  [] Modalities as prescribed   [] Call Physician     GOALS   Patient Goal(s): Patient goals :  \"To stop the pain\"    Short Term Goals Completed by 3 weeks Goal Status   STG 1 The pt will demonstrate improved postural awareness requiring <25% VC's with exercises In progress   STG 2 The pt will demo improved L shoulder PROM WNL's all planes in order to increase ease with active ADL's Met       Long Term Goals Completed by 6 weeks Goal Status   LTG 1 The pt will demo improved L shoulder AROM WNL's all planes in order to increase ease with ADL's In progress   LTG 2 The pt will demonstrate improved L UE strength 5/5 in order to lift/carry with decreased pain In progress   LTG 3 The pt will have an increase in UEFI score >/=75/80 points in order to increase functional activity tolerance In progress   LTG 4 The pt will be

## 2022-08-12 ENCOUNTER — HOSPITAL ENCOUNTER (OUTPATIENT)
Dept: PHYSICAL THERAPY | Age: 47
Setting detail: THERAPIES SERIES
Discharge: HOME | End: 2022-08-12
Payer: COMMERCIAL

## 2022-08-12 PROCEDURE — 97110 THERAPEUTIC EXERCISES: CPT

## 2022-08-12 ASSESSMENT — PAIN DESCRIPTION - DESCRIPTORS: DESCRIPTORS: SORE

## 2022-08-12 ASSESSMENT — PAIN DESCRIPTION - LOCATION: LOCATION: SHOULDER

## 2022-08-12 ASSESSMENT — PAIN SCALES - GENERAL: PAINLEVEL_OUTOF10: 4

## 2022-08-12 ASSESSMENT — PAIN DESCRIPTION - PAIN TYPE: TYPE: ACUTE PAIN

## 2022-08-12 ASSESSMENT — PAIN DESCRIPTION - ORIENTATION: ORIENTATION: LEFT

## 2022-08-12 NOTE — PROGRESS NOTES
[address] [facility]  Outpatient Physical Therapy    Treatment Note        Date: 2022  Patient: Vivienne Villarreal  : 1975   Confirmed: Yes  MRN: [id number]  Referring Provider: Juliocesar Holcomb MD  Secondary Referring Provider (If applicable):     Medical Diagnosis: Unspecified sprain of unspecified shoulder joint, initial encounter [K21.533M]    Treatment Diagnosis: L shoulder pain, decreased L shoulder A/PROM, decreased L UE strength, decreased posture/shoulder positioning/biomechanics, and decreased functional activity tolerance    Visit Information:  Insurance: Payor: Jaymie Herter / Plan: [facility] - [place] PPO / Product Type: *No Product type* /   PT Visit Information  Onset Date: 22  Total # of Visits Approved: 75  Total # of Visits to Date: 7  No Show: 0  Canceled Appointment: 0  Progress Note Counter:     Subjective Information:  Subjective: \"I am sore today. I toss and turn at night. \"  HEP Compliance:  [x] Good [] Fair [] Poor [] Reports not doing due to:    Pain Screening  Patient Currently in Pain: Yes  Pain Assessment: 0-10  Pain Level: 4  Pain Type: Acute pain  Pain Location: Shoulder  Pain Orientation: Left  Pain Descriptors: Sore    Treatment:  Exercises:  Exercises  Exercise 1: pendulums with 1# 4 way x 20  ea. Exercise 2: pulleys 2' flexion / 2' scaption. Exercise 3: table slides flexion/scaption  5\" x 10  Exercise 4: scap retract x 15 x 5\"  Exercise 5: supine wand 2# Flexion, ER,  Standing scaption 5\"x10  Exercise 7: PROM: L shoulder flex, abd, ER, IR (ER/IR in varying degrees of abduction with humeral head blocking) x10 mins  Exercise 8: UBE L 1.0 gentle 2' Fwd / 2' Back  Exercise 20: HEP: table slides, UT stretch       Objective Measures:        Strength: [x] NT    ROM: [x] NT    Assessment:    Body Structures, Functions, Activity Limitations Requiring Skilled Therapeutic Intervention: Increased pain, Decreased ADL status, Decreased ROM, Decreased strength, Decreased high-level IADLs  Assessment: Patient tolerates UBE bike this date in forward and retro with no increased pain. Continued to perform PROM on Lt shoulder with gentle distraction to improve function. Monitoring pain sxs when using resistance bar to avoid increases of pain shoulder. Continuing to progress patient as tolerated and monitor pain sxs. Treatment Diagnosis: L shoulder pain, decreased L shoulder A/PROM, decreased L UE strength, decreased posture/shoulder positioning/biomechanics, and decreased functional activity tolerance  Therapy Prognosis: Excellent       Post-Pain Assessment:       Pain Rating (0-10 pain scale):  2-3 /10   Location and pain description same as pre-treatment unless indicated. Action: [] NA   [x] Perform HEP  [] Meds as prescribed  [] Modalities as prescribed   [] Call Physician     GOALS   Patient Goal(s): Patient goals :  \"To stop the pain\"    Short Term Goals Completed by 3 weeks Goal Status   STG 1 The pt will demonstrate improved postural awareness requiring <25% VC's with exercises In progress   STG 2 The pt will demo improved L shoulder PROM WNL's all planes in order to increase ease with active ADL's Met     Long Term Goals Completed by 6 weeks Goal Status   LTG 1 The pt will demo improved L shoulder AROM WNL's all planes in order to increase ease with ADL's In progress   LTG 2 The pt will demonstrate improved L UE strength 5/5 in order to lift/carry with decreased pain In progress   LTG 3 The pt will have an increase in UEFI score >/=75/80 points in order to increase functional activity tolerance In progress   LTG 4 The pt will be indep/compliant with HEP in order to self-manage symptoms upon D/C In progress     Plan:  Frequency/Duration:  Plan  Plan Frequency: 2-3  Plan weeks: 6  Current Treatment Recommendations: Strengthening, ROM, Manual Therapy - Soft Tissue Mobilization, Neuromuscular re-education, Manual Therapy - Joint Manipulation, Pain management, Home exercise program,

## 2022-08-15 ENCOUNTER — HOSPITAL ENCOUNTER (OUTPATIENT)
Dept: PHYSICAL THERAPY | Age: 47
Setting detail: THERAPIES SERIES
Discharge: HOME | End: 2022-08-15
Payer: COMMERCIAL

## 2022-08-15 PROCEDURE — 97110 THERAPEUTIC EXERCISES: CPT

## 2022-08-15 ASSESSMENT — PAIN DESCRIPTION - ORIENTATION: ORIENTATION: LEFT

## 2022-08-15 ASSESSMENT — PAIN SCALES - GENERAL: PAINLEVEL_OUTOF10: 4

## 2022-08-15 ASSESSMENT — PAIN DESCRIPTION - LOCATION: LOCATION: SHOULDER

## 2022-08-15 ASSESSMENT — PAIN DESCRIPTION - PAIN TYPE: TYPE: ACUTE PAIN

## 2022-08-15 ASSESSMENT — PAIN DESCRIPTION - DESCRIPTORS: DESCRIPTORS: SORE;THROBBING

## 2022-08-15 NOTE — PROGRESS NOTES
[address] [facility]  Outpatient Physical Therapy    Treatment Note        Date: 8/15/2022  Patient: Bibiana Nathan  : 1975   Confirmed: Yes  MRN: [id number]  Referring Provider: Noel Snyder MD  Secondary Referring Provider (If applicable):     Medical Diagnosis: Unspecified sprain of unspecified shoulder joint, initial encounter [F43.205N]    Treatment Diagnosis: L shoulder pain, decreased L shoulder A/PROM, decreased L UE strength, decreased posture/shoulder positioning/biomechanics, and decreased functional activity tolerance    Visit Information:  Insurance: Payor: Jr Oroz[employer] / Plan: [employer] - [place] PPO / Product Type: *No Product type* /   PT Visit Information  Onset Date: 22  Total # of Visits Approved: 75  Total # of Visits to Date: 8  No Show: 0  Canceled Appointment: 0  Progress Note Counter: -    Subjective Information:  Subjective: \"I am a little sore this morning. \"  HEP Compliance:  [x] Good [] Fair [] Poor [] Reports not doing due to:    Pain Screening  Patient Currently in Pain: Yes  Pain Assessment: 0-10  Pain Level: 4  Pain Type: Acute pain  Pain Location: Shoulder  Pain Orientation: Left  Pain Descriptors: Sore, Throbbing    Treatment:  Exercises:  Exercises  Exercise 2: pulleys 2' flexion / 2' scaption. Exercise 3: table slides flexion/scaption  5\" x 10  Exercise 4: scap retract x 15 x 5\"  Exercise 5: supine wand 2# Flexion, ER,  Standing scaption 5\"x10  Exercise 7: PROM: L shoulder flex, abd, ER, IR (ER/IR in varying degrees of abduction with humeral head blocking) x10 mins  Exercise 9: Modified corner stretch 3 times 30 sec hold on Left (W stretch)     Manual:   Manual Therapy  Joint Mobilization: GH inferior joint mobs grade II-III at end range abduction, long axis pdistraction throughout PROM in all planes    Objective Measures:        Strength: [x] NT    ROM: [x] NT    Assessment:    Body Structures, Functions, Activity Limitations Requiring Skilled Therapeutic Intervention: Increased pain, Decreased ADL status, Decreased ROM, Decreased strength, Decreased high-level IADLs  Assessment: Pt continues to demonstrate soreness and pain when arriving to therapy in Lt shoulder. Performed PROM with gentle distraction with motion this date with increases in ROM with reduced pain noted. Good relief noted from modified corner chest stretch this date. Continue to monitor pain symptoms and progress as tolerated. Treatment Diagnosis: L shoulder pain, decreased L shoulder A/PROM, decreased L UE strength, decreased posture/shoulder positioning/biomechanics, and decreased functional activity tolerance  Therapy Prognosis: Excellent       Patient Education: [] NA       Post-Pain Assessment:       Pain Rating (0-10 pain scale): 3  /10   Location and pain description same as pre-treatment unless indicated. Action: [] NA   [x] Perform HEP  [] Meds as prescribed  [] Modalities as prescribed   [] Call Physician     GOALS   Patient Goal(s): Patient goals :  \"To stop the pain\"    Short Term Goals Completed by 3 weeks Goal Status   STG 1 The pt will demonstrate improved postural awareness requiring <25% VC's with exercises In progress   STG 2 The pt will demo improved L shoulder PROM WNL's all planes in order to increase ease with active ADL's Met     Long Term Goals Completed by 6 weeks Goal Status   LTG 1 The pt will demo improved L shoulder AROM WNL's all planes in order to increase ease with ADL's In progress   LTG 2 The pt will demonstrate improved L UE strength 5/5 in order to lift/carry with decreased pain In progress   LTG 3 The pt will have an increase in UEFI score >/=75/80 points in order to increase functional activity tolerance In progress   LTG 4 The pt will be indep/compliant with HEP in order to self-manage symptoms upon D/C In progress     Plan:  Frequency/Duration:  Plan  Plan Frequency: 2-3  Plan weeks: 6  Current Treatment Recommendations: Strengthening, ROM, Manual Therapy - Soft Tissue Mobilization, Neuromuscular re-education, Manual Therapy - Joint Manipulation, Pain management, Home exercise program, Return to work related activity, Patient/Caregiver education & training, Modalities, Positioning, Integrated dry needling  Pt to continue current HEP. See objective section for any therapeutic exercise changes, additions or modifications this date.     Therapy Time:      PT Individual Minutes  Time In: 2613  Time Out: 8964  Minutes: 45  Timed Code Treatment Minutes: 45 Minutes  Procedure Minutes: 0  Timed Activity Minutes Units   Ther Ex 45 3   Electronically signed by Walter Oquendo PTA on 8/15/22 at 9:34 AM EDT

## 2022-08-17 ENCOUNTER — HOSPITAL ENCOUNTER (OUTPATIENT)
Dept: PHYSICAL THERAPY | Age: 47
Setting detail: THERAPIES SERIES
Discharge: HOME | End: 2022-08-17
Payer: COMMERCIAL

## 2022-08-17 PROCEDURE — 97110 THERAPEUTIC EXERCISES: CPT

## 2022-08-17 ASSESSMENT — PAIN DESCRIPTION - ORIENTATION: ORIENTATION: LEFT;ANTERIOR

## 2022-08-17 ASSESSMENT — PAIN DESCRIPTION - LOCATION: LOCATION: SHOULDER

## 2022-08-17 ASSESSMENT — PAIN DESCRIPTION - DESCRIPTORS: DESCRIPTORS: SORE

## 2022-08-17 ASSESSMENT — PAIN DESCRIPTION - PAIN TYPE: TYPE: ACUTE PAIN

## 2022-08-17 ASSESSMENT — PAIN SCALES - GENERAL: PAINLEVEL_OUTOF10: 5

## 2022-08-17 NOTE — PROGRESS NOTES
[facility]  Outpatient Physical Therapy    Treatment Note        Date: 2022  Patient: Mc Dodd  : 1975   Confirmed: Yes  MRN: [id number]  Referring Provider: Seema Lilly MD  Secondary Referring Provider (If applicable):     Medical Diagnosis: Unspecified sprain of unspecified shoulder joint, initial encounter [S45.471U]    Secondary Diagnosis:     Treatment Diagnosis: L shoulder pain, decreased L shoulder A/PROM, decreased L UE strength, decreased posture/shoulder positioning/biomechanics, and decreased functional activity tolerance    Visit Information:  Insurance: [facility]: [facility] / Plan: [facility] / Product Type: *No Product type* /   PT Visit Information  Onset Date: 22  Total # of Visits Approved: 75  Total # of Visits to Date: 8  No Show: 0  Canceled Appointment: 0  Progress Note Counter: -    Subjective Information:  Subjective: I am sore every morning when I get up it hurts in the front of the shoulder, it has to be the way I am sleeping on it at night. HEP Compliance:  [x] Good [] Fair [] Poor [] Reports not doing due to:    Pain Screening  Patient Currently in Pain: Yes  Pain Assessment: 0-10  Pain Level: 5  Pain Type: Acute pain  Pain Location: Shoulder  Pain Orientation: Left, Anterior  Pain Descriptors: Sore    Treatment:  Exercises:  Exercises  Exercise 2: pulleys 2' flexion / 2' scaption. Exercise 8: UBE L 1.0 Retro  4'  Exercise 9: Modified corner stretch 3 times 30 sec hold on Left (W stretch)  Exercise 10: 90/90 flexion/ABD performed in front of mirror for visual feedback to reduce compensatation motions of Left shoulder  Exercise 11: Standing Chest Pulls with YTB 2 x 10  Exercise 12: Supine Towel Roll Stretch 3 min hold  Exercise 20: HEP: Open Book, supine towel roll stretch. Objective Measures:          Strength: [x] NT  [] MMT completed:     ROM: [x] NT  [] ROM measurements:          Balance: [x] NT       Assessment:    Body Structures, Functions, Activity Limitations Requiring Skilled Therapeutic Intervention: Increased pain, Decreased ADL status, Decreased ROM, Decreased strength, Decreased high-level IADLs  Assessment: Introduction of strengthening exercises today, with focus on body mechanics and decreased compenstation of Left shoulder with improved carry over when performed in front of a mirror for visual feedback, progression of exercises with HEP to allow pt to improve mobility and manage pain in left shoulder. Pt reports good understanding of new HEP, will need further cues and focus on improved motions as strengthening exercises progressed as tolerated. Treatment Diagnosis: L shoulder pain, decreased L shoulder A/PROM, decreased L UE strength, decreased posture/shoulder positioning/biomechanics, and decreased functional activity tolerance  Therapy Prognosis: Excellent       Patient Education: [x] NA       Post-Pain Assessment:       Pain Rating (0-10 pain scale):   1/10   Location and pain description same as pre-treatment unless indicated. Action: [] NA   [x] Perform HEP  [] Meds as prescribed  [] Modalities as prescribed   [] Call Physician     GOALS   Patient Goal(s): Patient goals :  \"To stop the pain\"    Short Term Goals Completed by 3 weeks Goal Status   STG 1 The pt will demonstrate improved postural awareness requiring <25% VC's with exercises In progress   STG 2 The pt will demo improved L shoulder PROM WNL's all planes in order to increase ease with active ADL's Met       Long Term Goals Completed by 6 weeks Goal Status   LTG 1 The pt will demo improved L shoulder AROM WNL's all planes in order to increase ease with ADL's In progress   LTG 2 The pt will demonstrate improved L UE strength 5/5 in order to lift/carry with decreased pain In progress   LTG 3 The pt will have an increase in UEFI score >/=75/80 points in order to increase functional activity tolerance In progress   LTG 4 The pt will be indep/compliant with HEP in order to self-manage symptoms upon D/C In progress            Plan:  Frequency/Duration:  Plan  Plan Frequency: 2-3  Plan weeks: 6  Current Treatment Recommendations: Strengthening, ROM, Manual Therapy - Soft Tissue Mobilization, Neuromuscular re-education, Manual Therapy - Joint Manipulation, Pain management, Home exercise program, Return to work related activity, Patient/Caregiver education & training, Modalities, Positioning, Integrated dry needling  Pt to continue current HEP. See objective section for any therapeutic exercise changes, additions or modifications this date.     Therapy Time:      PT Individual Minutes  Time In: 0813  Time Out: 7599  Minutes: 45  Timed Code Treatment Minutes: 45 Minutes  Procedure Minutes:0  Timed Activity Minutes Units   Ther Ex 45 3     Electronically signed by Caro Georges PTA on 8/17/22 at 11:02 AM EDT

## 2022-08-19 ENCOUNTER — HOSPITAL ENCOUNTER (OUTPATIENT)
Dept: PHYSICAL THERAPY | Age: 47
Setting detail: THERAPIES SERIES
Discharge: HOME | End: 2022-08-19
Payer: COMMERCIAL

## 2022-08-19 PROCEDURE — 97110 THERAPEUTIC EXERCISES: CPT

## 2022-08-19 ASSESSMENT — PAIN DESCRIPTION - DESCRIPTORS: DESCRIPTORS: SORE

## 2022-08-19 ASSESSMENT — PAIN SCALES - GENERAL: PAINLEVEL_OUTOF10: 4

## 2022-08-19 ASSESSMENT — PAIN DESCRIPTION - LOCATION: LOCATION: SHOULDER

## 2022-08-19 ASSESSMENT — PAIN DESCRIPTION - PAIN TYPE: TYPE: ACUTE PAIN

## 2022-08-19 ASSESSMENT — PAIN DESCRIPTION - FREQUENCY: FREQUENCY: INTERMITTENT

## 2022-08-19 ASSESSMENT — PAIN DESCRIPTION - ORIENTATION: ORIENTATION: LEFT;ANTERIOR

## 2022-08-19 NOTE — PROGRESS NOTES
[address] [facility]  Outpatient Physical Therapy    Treatment Note        Date: 2022  Patient:  Distance  : 1975   Confirmed: Yes  MRN: [id number]  Referring Provider: Sho Sousa MD  Secondary Referring Provider (If applicable):     Medical Diagnosis: Unspecified sprain of unspecified shoulder joint, initial encounter [K20.593M]    Secondary Diagnosis:     Treatment Diagnosis: L shoulder pain, decreased L shoulder A/PROM, decreased L UE strength, decreased posture/shoulder positioning/biomechanics, and decreased functional activity tolerance    Visit Information:  Insurance: Payor: Verenice Kimball / Plan: [employer] - [place] PPO / Product Type: *No Product type* /   PT Visit Information  Onset Date: 22  Total # of Visits Approved: 75  Total # of Visits to Date: 10  No Show: 0  Canceled Appointment: 0  Progress Note Counter: 10/8-12    Subjective Information:  Subjective: \"I am always sore in my shoulder when I wake up. \"  HEP Compliance:  [x] Good [] Fair [] Poor [] Reports not doing due to:    Pain Screening  Patient Currently in Pain: Yes  Pain Assessment: 0-10  Pain Level: 4  Pain Type: Acute pain  Pain Location: Shoulder  Pain Orientation: Left, Anterior  Pain Descriptors: Sore  Pain Frequency: Intermittent    Treatment:  Exercises:  Exercises  Exercise 2: pulleys 2' flexion / 2' scaption. Exercise 6: Open Book stretch 10 x 5 seconds  Exercise 8: UBE L 1.0 Retro  4'  Exercise 9: Modified corner stretch 3 times 30 sec hold on Left (W stretch)  Exercise 10: 90/90 flexion/ABD performed in front of mirror for visual feedback to reduce compensatation motions of Left shoulder  Exercise 11: Standing Chest Pulls with YTB x 15ea - 2 way(Palm down/Palm up)  Exercise 20: HEP: Open Book, supine towel roll stretch.        Manual:   Manual Therapy  Joint Mobilization: [address] inferior joint mobs grade II-III at end range abduction, long axis pdistraction throughout PROM in all planes  Other: total min: 5 minutes         Objective Measures:      Strength: [] NT  [x] MMT completed:     Strength LUE  L Shoulder Flexion: 4/5, 4+/5  L Shoulder ABduction: 4/5, 4+/5  L Shoulder Internal Rotation: 4/5  L Shoulder External Rotation: 4-/5 (slight increased pain, posterior shoulder)  L Elbow Flexion: 4/5  L Elbow Extension: 4-/5 (slgith pain, posterior shoulder)              ROM: [] NT  [x] ROM measurements:     AROM LUE (degrees)  L Shoulder Flexion 0-180: 175  L Shoulder ABduction 0-180: 175  L Shoulder Int Rotation  0-70: T10  L Shoulder Ext Rotation  0-90: T5      Assessment: Body Structures, Functions, Activity Limitations Requiring Skilled Therapeutic Intervention: Increased pain, Decreased ADL status, Decreased ROM, Decreased strength, Decreased high-level IADLs  Assessment: Continued with newly introduced exercises from last session and adding additional exercises this date to focus on mechanics and strength in Lt shoulder. Pt improved with flexion and abduction this date with reduced compensation when utilizing mirror. Occ cues needed to avoid extended posture when performing standing chest pulls this date. Patient demonstrated significant increases in strength since taken at evaluation as well as improved AROM with Lt shoulder. Treatment Diagnosis: L shoulder pain, decreased L shoulder A/PROM, decreased L UE strength, decreased posture/shoulder positioning/biomechanics, and decreased functional activity tolerance  Therapy Prognosis: Excellent     Post-Pain Assessment:       Pain Rating (0-10 pain scale):  \"its just sore. \" /10   Location and pain description same as pre-treatment unless indicated. Action: [] NA   [x] Perform HEP  [] Meds as prescribed  [] Modalities as prescribed   [] Call Physician     GOALS   Patient Goal(s): Patient goals :  \"To stop the pain\"    Short Term Goals Completed by 3 weeks Goal Status   STG 1 The pt will demonstrate improved postural awareness requiring <25% VC's with exercises In progress   STG 2 The pt will demo improved L shoulder PROM WNL's all planes in order to increase ease with active ADL's Met     Long Term Goals Completed by 6 weeks Goal Status   LTG 1 The pt will demo improved L shoulder AROM WNL's all planes in order to increase ease with ADL's In progress   LTG 2 The pt will demonstrate improved L UE strength 5/5 in order to lift/carry with decreased pain In progress   LTG 3 The pt will have an increase in UEFI score >/=75/80 points in order to increase functional activity tolerance In progress   LTG 4 The pt will be indep/compliant with HEP in order to self-manage symptoms upon D/C In progress     Plan:  Frequency/Duration:  Plan  Plan Frequency: 2-3  Plan weeks: 6  Current Treatment Recommendations: Strengthening, ROM, Manual Therapy - Soft Tissue Mobilization, Neuromuscular re-education, Manual Therapy - Joint Manipulation, Pain management, Home exercise program, Return to work related activity, Patient/Caregiver education & training, Modalities, Positioning, Integrated dry needling  Pt to continue current HEP. See objective section for any therapeutic exercise changes, additions or modifications this date.     Therapy Time:      PT Individual Minutes  Time In: 0800  Time Out: 7544  Minutes: 49  Timed Code Treatment Minutes: 49 Minutes  Procedure Minutes: 0    Timed Activity Minutes Units   Ther Ex 44 3   Manual  5      Electronically signed by Kat Patten PTA on 8/19/22 at 8:54 AM EDT

## 2022-08-22 ENCOUNTER — HOSPITAL ENCOUNTER (OUTPATIENT)
Dept: PHYSICAL THERAPY | Age: 47
Setting detail: THERAPIES SERIES
Discharge: HOME | End: 2022-08-22
Payer: COMMERCIAL

## 2022-08-22 PROCEDURE — 97110 THERAPEUTIC EXERCISES: CPT

## 2022-08-22 ASSESSMENT — PAIN DESCRIPTION - DESCRIPTORS: DESCRIPTORS: SORE

## 2022-08-22 ASSESSMENT — PAIN DESCRIPTION - ORIENTATION: ORIENTATION: LEFT

## 2022-08-22 ASSESSMENT — PAIN SCALES - GENERAL: PAINLEVEL_OUTOF10: 3

## 2022-08-22 ASSESSMENT — PAIN DESCRIPTION - LOCATION: LOCATION: SHOULDER

## 2022-08-22 NOTE — PROGRESS NOTES
date  Exercise 11: Standing Chest Pulls with YTB x 15ea - 2 way(Palm down/Palm up)  Exercise 20: HEP: tband IR/ER, rows/lats/chest pulls  Treatment Reasoning  Limitations addressed: Strength    *Indicates exercise, modality, or manual techniques to be initiated when appropriate    Objective Measures:     Strength: [x] NT  [] MMT completed:    ROM: [x] NT  [] ROM measurements:    Balance: [x] NT     Assessment: Body Structures, Functions, Activity Limitations Requiring Skilled Therapeutic Intervention: Increased pain, Decreased ADL status, Decreased ROM, Decreased strength, Decreased high-level IADLs  Assessment: Progressed ther ex for further strength and stability of the L shoulder with good tolerance though intermittent pain reported within tolerable ranges. Intermittent VC's required to decrease L UT compensations with good carryover. Slight increased pain with wall push ups with a plus therefore decreased reps performed. Progressed HEP for further strengthening with good understanding. Treatment Diagnosis: L shoulder pain, decreased L shoulder A/PROM, decreased L UE strength, decreased posture/shoulder positioning/biomechanics, and decreased functional activity tolerance  Therapy Prognosis: Excellent     Patient Education: [] NA     Post-Pain Assessment:       Pain Rating (0-10 pain scale):   2-3/10   Location and pain description same as pre-treatment unless indicated. Action: [] NA   [x] Perform HEP  [] Meds as prescribed  [] Modalities as prescribed   [] Call Physician     GOALS   Patient Goal(s): Patient goals :  \"To stop the pain\"    Short Term Goals Completed by 3 weeks Goal Status   STG 1 The pt will demonstrate improved postural awareness requiring <25% VC's with exercises In progress   STG 2 The pt will demo improved L shoulder PROM WNL's all planes in order to increase ease with active ADL's Met     Long Term Goals Completed by 6 weeks Goal Status   LTG 1 The pt will demo improved L shoulder AROM WNL's all planes in order to increase ease with ADL's Met   LTG 2 The pt will demonstrate improved L UE strength 5/5 in order to lift/carry with decreased pain In progress   LTG 3 The pt will have an increase in UEFI score >/=75/80 points in order to increase functional activity tolerance In progress   LTG 4 The pt will be indep/compliant with HEP in order to self-manage symptoms upon D/C In progress     Plan:  Frequency/Duration:  Plan  Plan Frequency: 2-3  Plan weeks: 6  Current Treatment Recommendations: Strengthening, ROM, Manual Therapy - Soft Tissue Mobilization, Neuromuscular re-education, Manual Therapy - Joint Manipulation, Pain management, Home exercise program, Return to work related activity, Patient/Caregiver education & training, Modalities, Positioning, Integrated dry needling  Pt to continue current HEP. See objective section for any therapeutic exercise changes, additions or modifications this date.     Therapy Time:   PT Individual Minutes  Time In: 0802  Time Out: 0840  Minutes: 38  Timed Code Treatment Minutes: 38 Minutes  Procedure Minutes:0  Timed Activity Minutes Units   Ther Ex 38 3     Electronically signed by Dhaval Du PT on 8/22/22 at 8:31 AM EDT

## 2022-08-24 ENCOUNTER — HOSPITAL ENCOUNTER (OUTPATIENT)
Dept: PHYSICAL THERAPY | Age: 47
Setting detail: THERAPIES SERIES
Discharge: HOME | End: 2022-08-24
Payer: COMMERCIAL

## 2022-08-24 NOTE — PROGRESS NOTES
Therapy                            Cancellation/No-show Note      Date: 2022  Patient: Willian Thorpe [de-identified]50 y.o. male)  : 1975  MRN:  [id number]  Referring Physician: Preeti Hsu MD    Medical Diagnosis: Unspecified sprain of unspecified shoulder joint, initial encounter [S43.409A]      Visit Information:  Visits to Date 11   No Show/Cancelled Appts: 0       For today's appointment patient:  [x]  Cancelled  []  Rescheduled appointment  []  No-show   []  Called pt to remind of next appointment     Reason given by patient:  []  Patient ill  []  Conflicting appointment  []  No transportation    []  Conflict with work  [x]  No reason given  []  Other:      [x] Pt has future appointments scheduled, no follow up needed  [] Pt requests to be on hold.     Reason:   If > 2 weeks please discuss with therapist.  [] Therapist to call pt for follow up     Comments:       Signature: Electronically signed by Franko Parra PT on 22 at 7:58 AM EDT

## 2022-08-29 ENCOUNTER — HOSPITAL ENCOUNTER (OUTPATIENT)
Dept: PHYSICAL THERAPY | Age: 47
Setting detail: THERAPIES SERIES
Discharge: HOME | End: 2022-08-29
Payer: COMMERCIAL

## 2022-08-29 PROCEDURE — 97110 THERAPEUTIC EXERCISES: CPT

## 2022-08-29 ASSESSMENT — PAIN DESCRIPTION - DESCRIPTORS: DESCRIPTORS: THROBBING

## 2022-08-29 ASSESSMENT — PAIN DESCRIPTION - ORIENTATION: ORIENTATION: LEFT

## 2022-08-29 ASSESSMENT — PAIN DESCRIPTION - PAIN TYPE: TYPE: ACUTE PAIN

## 2022-08-29 ASSESSMENT — PAIN DESCRIPTION - FREQUENCY: FREQUENCY: INTERMITTENT

## 2022-08-29 ASSESSMENT — PAIN DESCRIPTION - LOCATION: LOCATION: SHOULDER

## 2022-08-29 ASSESSMENT — PAIN SCALES - GENERAL: PAINLEVEL_OUTOF10: 4

## 2022-08-29 NOTE — PROGRESS NOTES
[facility] [facility]  Outpatient Physical Therapy    Treatment Note        Date: 2022  Patient: Wolfgang Weaver  : 1975   Confirmed: Yes  MRN: [id number]  Referring Provider: Carolyn Aschoff, MD  Secondary Referring Provider (If applicable):     Medical Diagnosis: Unspecified sprain of unspecified shoulder joint, initial encounter [X94.086P]    Secondary Diagnosis:     Treatment Diagnosis: L shoulder pain, decreased L shoulder A/PROM, decreased L UE strength, decreased posture/shoulder positioning/biomechanics, and decreased functional activity tolerance    Visit Information:  Insurance: Payor: Radha Osiris / Plan: Radha Osiris - [place] PPO / Product Type: *No Product type* /   PT Visit Information  Onset Date: 22  Total # of Visits Approved: 75  Total # of Visits to Date: 12  No Show: 0  Canceled Appointment: 1  Progress Note Counter: - (per POC)    Subjective Information:  Subjective: Pain is throbbing this morning usually in the mornings is when I have pain. also I went golfing this morning without any pain or irritation. HEP Compliance:  [x] Good [] Fair [] Poor [] Reports not doing due to:    Pain Screening  Patient Currently in Pain: Yes  Pain Level: 4  Pain Type: Acute pain  Pain Location: Shoulder  Pain Orientation: Left  Pain Descriptors: Throbbing  Pain Frequency: Intermittent    Treatment:  Exercises:  Exercises  Exercise 1: tband: Rows/Lats RTB x15, IR/ER x15- TC's to decrease UT compensations with rows  Exercise 2: 4 way ball stabs 0.5 kg x10 ea. Exercise 3: Ball taps 0.5 kg x3 at number wall  Exercise 4: Wall push ups with a plus x5  Exercise 5: prone scap: rows/lats, scap retract, horiz abd IR x10 ea. Exercise 6: Open Book stretch 10 x 5 seconds + lumbar lock  Exercise 7: Bent over rows, scap retract/shrug//Lats/ H.ABD x 5  Exercise 8: UBE L 2.0 2.5'F/2. 5'R  Exercise 9: Modified corner stretch 4 times 30 sec hold on Left (W stretch)  Exercise 10: 90/90 flexion/ABD x15- able to control shoulder elevation without use of mirror this date  Exercise 11: Standing 3 way Chest Pulls with YTB x 10 ea light pain increase with left extension pull  Exercise 20: HEP: tband IR/ER, rows/lats/chest pulls  Treatment Reasoning  Limitations addressed: Strength        *Indicates exercise, modality, or manual techniques to be initiated when appropriate    Objective Measures:        Strength: [x] NT  [] MMT completed:      ROM: [x] NT  [] ROM measurements:       Balance: [x] NT       Assessment: Body Structures, Functions, Activity Limitations Requiring Skilled Therapeutic Intervention: Increased pain, Decreased ADL status, Decreased ROM, Decreased strength, Decreased high-level IADLs  Assessment: Progressed exercises this date with no posistive results. pain continues to decrease with tx and HEP. Pt is beginning to reintergrate into liesure activities. with mnimal amounts of increases in pain and irritation that aleviate with time. Treatment Diagnosis: L shoulder pain, decreased L shoulder A/PROM, decreased L UE strength, decreased posture/shoulder positioning/biomechanics, and decreased functional activity tolerance  Therapy Prognosis: Excellent       Patient Education: [x] NA       Post-Pain Assessment:       Pain Rating (0-10 pain scale):   1 /10   Location and pain description same as pre-treatment unless indicated. Action: [] NA   [x] Perform HEP  [] Meds as prescribed  [] Modalities as prescribed   [] Call Physician     GOALS   Patient Goal(s): Patient goals :  \"To stop the pain\"    Short Term Goals Completed by 3 weeks Goal Status   STG 1 The pt will demonstrate improved postural awareness requiring <25% VC's with exercises In progress   STG 2 The pt will demo improved L shoulder PROM WNL's all planes in order to increase ease with active ADL's Met     Long Term Goals Completed by 6 weeks Goal Status   LTG 1 The pt will demo improved L shoulder AROM WNL's all planes in order to increase ease with ADL's Met   LTG 2 The pt will demonstrate improved L UE strength 5/5 in order to lift/carry with decreased pain In progress   LTG 3 The pt will have an increase in UEFI score >/=75/80 points in order to increase functional activity tolerance In progress   LTG 4 The pt will be indep/compliant with HEP in order to self-manage symptoms upon D/C In progress            Plan:  Frequency/Duration:  Plan  Plan Frequency: 2-3  Plan weeks: 6  Current Treatment Recommendations: Strengthening, ROM, Manual Therapy - Soft Tissue Mobilization, Neuromuscular re-education, Manual Therapy - Joint Manipulation, Pain management, Home exercise program, Return to work related activity, Patient/Caregiver education & training, Modalities, Positioning, Integrated dry needling  Pt to continue current HEP. See objective section for any therapeutic exercise changes, additions or modifications this date.     Therapy Time:      PT Individual Minutes  Time In: 0800  Time Out: 5459  Minutes: 44  Timed Code Treatment Minutes: 44 Minutes  Procedure Minutes:0  Timed Activity Minutes Units   Ther Ex 44 3     Electronically signed by Jay Hurst PTA on 8/29/22 at 8:51 AM EDT

## 2022-08-31 ENCOUNTER — HOSPITAL ENCOUNTER (OUTPATIENT)
Dept: PHYSICAL THERAPY | Age: 47
Setting detail: THERAPIES SERIES
Discharge: HOME | End: 2022-08-31
Payer: COMMERCIAL

## 2022-08-31 PROCEDURE — 97110 THERAPEUTIC EXERCISES: CPT

## 2022-08-31 ASSESSMENT — PAIN SCALES - GENERAL: PAINLEVEL_OUTOF10: 3

## 2022-08-31 NOTE — PROGRESS NOTES
[address] [facility]  Outpatient Physical Therapy    Treatment Note        Date: 2022  Patient: Vicki Tipton  : 1975   Confirmed: Yes  MRN: [id number]  Referring Provider: Kristie Singh MD  Secondary Referring Provider (If applicable):     Medical Diagnosis: Unspecified sprain of unspecified shoulder joint, initial encounter [I98.943V]    Secondary Diagnosis:     Treatment Diagnosis: L shoulder pain, decreased L shoulder A/PROM, decreased L UE strength, decreased posture/shoulder positioning/biomechanics, and decreased functional activity tolerance    Visit Information:  Insurance: Payor: Maldonado Smart / Plan: Maldonado Smart - [place] PPO / Product Type: *No Product type* /   PT Visit Information  Onset Date: 22  Total # of Visits Approved: 75  Total # of Visits to Date: 13  No Show: 0  Canceled Appointment: 1  Progress Note Counter: - (per POC)    Subjective Information:  Subjective: Morning is the worse time for me, by noon my shoulder feels pretty good. It's got to be the way that I am sleeping at night, I tried to wear the sling to bed last night and that seemed to help some but I don't want to have to do that all the time. HEP Compliance:  [x] Good [] Fair [] Poor [] Reports not doing due to:    Pain Screening  Patient Currently in Pain: Yes  Pain Assessment: 0-10  Pain Level: 3  Best Pain Level: 1  Worst Pain Level: 5    Treatment:  Exercises:  Exercises  Exercise 1: tband: Rows/Lats GTB x15, IR/ER x15  Exercise 2: 4 way ball stabs 0.5 kg x15 ea. Exercise 3: Ball taps 0.5 kg x3 at number wall  Exercise 4: Wall push ups with a plus x10  Exercise 8: UBE L 3.5 2. 5'F/2. 5'R  Exercise 11: Standing 3 way Chest Pulls with YTB x 10 ea light pain increase with left extension pull  Exercise 13: Valspar Amo overhead and back x1  Exercise 14: Posterior Wall Reach 2# ball x 10 each side with alt. pattern.          Objective Measures:          Strength: [] NT  [x] MMT completed:     Strength LUE  L Shoulder Flexion: 4+/5  L Shoulder ABduction: 4+/5  L Shoulder Internal Rotation: 4+/5  L Shoulder External Rotation: 4/5 (Pain)  L Elbow Flexion: 4+/5  L Elbow Extension: 4+/5 (ache)              ROM: [x] NT  [] ROM measurements:       Balance: [x] NT       Assessment: Body Structures, Functions, Activity Limitations Requiring Skilled Therapeutic Intervention: Increased pain, Decreased ADL status, Decreased ROM, Decreased strength, Decreased high-level IADLs  Assessment: Pt's with improved Left shoulder strength with pain reported with ER and ABD MMT. Progressed exercises to include overhead Valspare to improve strength and endurnace for overhead activities to prepar pt for return to work duties. Encouraged pt to focus on stretching at home as focus increased to strengthening here in clinic. Treatment Diagnosis: L shoulder pain, decreased L shoulder A/PROM, decreased L UE strength, decreased posture/shoulder positioning/biomechanics, and decreased functional activity tolerance  Therapy Prognosis: Excellent       Patient Education: [x] NA       Post-Pain Assessment:       Pain Rating (0-10 pain scale):   3/10   Location and pain description same as pre-treatment unless indicated. Action: [] NA   [x] Perform HEP  [] Meds as prescribed  [] Modalities as prescribed   [] Call Physician     GOALS   Patient Goal(s): Patient goals :  \"To stop the pain\"    Short Term Goals Completed by 3 weeks Goal Status   STG 1 The pt will demonstrate improved postural awareness requiring <25% VC's with exercises In progress   STG 2 The pt will demo improved L shoulder PROM WNL's all planes in order to increase ease with active ADL's Met       Long Term Goals Completed by 6 weeks Goal Status   LTG 1 The pt will demo improved L shoulder AROM WNL's all planes in order to increase ease with ADL's Met   LTG 2 The pt will demonstrate improved L UE strength 5/5 in order to lift/carry with decreased pain In progress   LTG 3 The pt will have an increase in UEFI score >/=75/80 points in order to increase functional activity tolerance In progress   LTG 4 The pt will be indep/compliant with HEP in order to self-manage symptoms upon D/C In progress            Plan:  Frequency/Duration:  Plan  Plan Frequency: 2-3  Plan weeks: 6  Current Treatment Recommendations: Strengthening, ROM, Manual Therapy - Soft Tissue Mobilization, Neuromuscular re-education, Manual Therapy - Joint Manipulation, Pain management, Home exercise program, Return to work related activity, Patient/Caregiver education & training, Modalities, Positioning, Integrated dry needling  Pt to continue current HEP. See objective section for any therapeutic exercise changes, additions or modifications this date.     Therapy Time:      PT Individual Minutes  Time In: 0801  Time Out: 9669  Minutes: 42  Timed Code Treatment Minutes: 42 Minutes  Procedure Minutes:0  Timed Activity Minutes Units   Ther Ex 42 3     Electronically signed by Peace Spencer PTA on 8/31/22 at 9:08 AM EDT

## 2022-09-07 ENCOUNTER — HOSPITAL ENCOUNTER (OUTPATIENT)
Dept: PHYSICAL THERAPY | Age: 47
Setting detail: THERAPIES SERIES
Discharge: HOME | End: 2022-09-07
Payer: COMMERCIAL

## 2022-09-07 PROCEDURE — 97110 THERAPEUTIC EXERCISES: CPT

## 2022-09-07 ASSESSMENT — PAIN DESCRIPTION - PAIN TYPE: TYPE: ACUTE PAIN

## 2022-09-07 ASSESSMENT — PAIN DESCRIPTION - ORIENTATION: ORIENTATION: LEFT

## 2022-09-07 ASSESSMENT — PAIN DESCRIPTION - LOCATION: LOCATION: SHOULDER

## 2022-09-07 ASSESSMENT — PAIN SCALES - GENERAL: PAINLEVEL_OUTOF10: 3

## 2022-09-07 ASSESSMENT — PAIN DESCRIPTION - DESCRIPTORS: DESCRIPTORS: ACHING;SORE

## 2022-09-07 NOTE — PROGRESS NOTES
[address] [facility]  Outpatient Physical Therapy    Treatment Note        Date: 2022  Patient: Alec Black  : 1975   Confirmed: Yes  MRN: [id number]  Referring Provider: Blayne Ortega MD    Medical Diagnosis: Unspecified sprain of unspecified shoulder joint, initial encounter [S44.265L]       Treatment Diagnosis: L shoulder pain, decreased L shoulder A/PROM, decreased L UE strength, decreased posture/shoulder positioning/biomechanics, and decreased functional activity tolerance    Visit Information:  Insurance: Payor: Zaire Silva / Plan: BCBS - [place] PPO / Product Type: *No Product type* /   PT Visit Information  Onset Date: 22  PT Insurance Information: BCBS  Total # of Visits Approved: 75  Total # of Visits to Date: 14  No Show: 0  Canceled Appointment: 1  Progress Note Counter: - (per POC)    Subjective Information:  Subjective: The shoulder is sore today, I went back to work. I need to find what my limits are and when I get sore I back off. HEP Compliance:  [x] Good [] Fair [] Poor [] Reports not doing due to:    Pain Screening  Patient Currently in Pain: Yes  Pain Assessment: 0-10  Pain Level: 3  Pain Type: Acute pain  Pain Location: Shoulder  Pain Orientation: Left  Pain Descriptors: Aching, Sore    Treatment:  Exercises:  Exercises  Exercise 1: tband: Rows/Lats GTB x15, IR/ER x15  / T-band ABCs YTB x 1  Exercise 2: 4 way ball stabs x15 ea. Exercise 5: prone scap: rows/lats, scap retract, horiz abd IR,ER x10 ea. Exercise 6: Open Book stretch 10 x 5 seconds + lumbar lock  Exercise 8: UBE L 2.5 2. 5'F/2. 5'R  Exercise 9: Modified corner stretch 3 position 30 sec hold on Left (W stretch)  Exercise 12: Supine Towel Roll Stretch 3 min hold           Objective Measures:          Strength: [x] NT  [] MMT completed:       ROM: [x] NT  [] ROM measurements:           Balance: [x] NT       Assessment:    Body Structures, Functions, Activity Limitations Requiring Skilled Therapeutic Intervention: Increased pain, Decreased ADL status, Decreased ROM, Decreased strength, Decreased high-level IADLs  Assessment: Adjustments to exercise program with incrased time spent on stretching today due to increase soreness following pt's return to work activities. Extensive time spent on pt education on appropriate tolerance to activites and avoid intense pain with work duties. Treatment Diagnosis: L shoulder pain, decreased L shoulder A/PROM, decreased L UE strength, decreased posture/shoulder positioning/biomechanics, and decreased functional activity tolerance  Therapy Prognosis: Excellent       Patient Education: [] NA   Edu. Pt on moderation of work duties to improve management of pain levels. Importance of daily stretching post work day. Post-Pain Assessment:       Pain Rating (0-10 pain scale):   2/10   Location and pain description same as pre-treatment unless indicated. Action: [] NA   [x] Perform HEP  [] Meds as prescribed  [] Modalities as prescribed   [] Call Physician     GOALS   Patient Goal(s): Patient goals :  \"To stop the pain\"    Short Term Goals Completed by 3 weeks Goal Status   STG 1 The pt will demonstrate improved postural awareness requiring <25% VC's with exercises In progress   STG 2 The pt will demo improved L shoulder PROM WNL's all planes in order to increase ease with active ADL's Met       Long Term Goals Completed by 6 weeks Goal Status   LTG 1 The pt will demo improved L shoulder AROM WNL's all planes in order to increase ease with ADL's Met   LTG 2 The pt will demonstrate improved L UE strength 5/5 in order to lift/carry with decreased pain In progress   LTG 3 The pt will have an increase in UEFI score >/=75/80 points in order to increase functional activity tolerance In progress   LTG 4 The pt will be indep/compliant with HEP in order to self-manage symptoms upon D/C In progress            Plan:  Frequency/Duration:  Plan  Plan Frequency: 2-3  Plan weeks: 6  Current

## 2022-09-09 ENCOUNTER — HOSPITAL ENCOUNTER (OUTPATIENT)
Dept: PHYSICAL THERAPY | Age: 47
Setting detail: THERAPIES SERIES
Discharge: HOME | End: 2022-09-09
Payer: COMMERCIAL

## 2022-09-09 PROCEDURE — 97140 MANUAL THERAPY 1/> REGIONS: CPT

## 2022-09-09 PROCEDURE — 97110 THERAPEUTIC EXERCISES: CPT

## 2022-09-09 ASSESSMENT — PAIN DESCRIPTION - ORIENTATION: ORIENTATION: LEFT

## 2022-09-09 ASSESSMENT — PAIN DESCRIPTION - DESCRIPTORS: DESCRIPTORS: SORE

## 2022-09-09 ASSESSMENT — PAIN SCALES - GENERAL: PAINLEVEL_OUTOF10: 4

## 2022-09-09 ASSESSMENT — PAIN DESCRIPTION - PAIN TYPE: TYPE: ACUTE PAIN

## 2022-09-09 ASSESSMENT — PAIN DESCRIPTION - LOCATION: LOCATION: SHOULDER

## 2022-09-09 NOTE — PROGRESS NOTES
[address] [facility]  Outpatient Physical Therapy    Treatment Note        Date: 2022  Patient: Monika Weaver  : 1975   Confirmed: Yes  MRN: [id number]  Referring Provider: Pinky Coker MD    Medical Diagnosis: Unspecified sprain of unspecified shoulder joint, initial encounter [S47.518N]       Treatment Diagnosis: L shoulder pain, decreased L shoulder A/PROM, decreased L UE strength, decreased posture/shoulder positioning/biomechanics, and decreased functional activity tolerance    Visit Information:  Insurance: Payor: Devin Pace 150 / Plan: [place] - [place] PPO / Product Type: *No Product type* /   PT Visit Information  Onset Date: 22  PT Insurance Information: [employer]  Total # of Visits Approved: 75  Total # of Visits to Date: 15  No Show: 0  Canceled Appointment: 1  Progress Note Counter: 15/12- (per POC)    Subjective Information:  Subjective: 'Its sore from work. \" Pt reports shoulder feeling like \" limp noodle by the end of the week last week. \" Pt states hes been trying to find limits in the amount of work he can do. Reports increased pain in posterior shoulder, scap region as of yesterday. HEP Compliance:  [x] Good [] Fair [] Poor [] Reports not doing due to:    Pain Screening  Patient Currently in Pain: Yes  Pain Assessment: 0-10  Pain Level: 4  Pain Type: Acute pain  Pain Location: Shoulder  Pain Orientation: Left  Pain Descriptors: Sore    Treatment:  Exercises:  Exercises  Exercise 2: 4 way ball stabs x15 ea. Exercise 3: Ball taps 1 kg 3-pt wall tap x 10  Exercise 5: prone scap: rows/lats, scap retract, horiz abd IR,ER x10 ea. Exercise 6: Open Book stretch 10 x 5 seconds + lumbar lock  Exercise 8: UBE L 2.5 2. 5'F/2. 5'R  Exercise 9: Modified corner stretch 3 position 30 sec hold on Left (W stretch)  Exercise 11: Seated(no support) 3 way Chest Pulls with YTB x 10 ea  Exercise 12: Supine Towel Roll Stretch 3 min hold         Manual:   Manual Therapy  Joint Mobilization: GH inferior joint mobs grade II-III at end range abduction, long axis pdistraction throughout PROM in all planes  Other: total min:6 minutes       Objective Measures:        Strength: [x] NT      ROM: [] NT  [x] ROM measurements:     AROM LUE (degrees)  L Shoulder Flexion 0-180: 178  L Shoulder ABduction 0-180: 177 - posterior shoulder pain(comes and goes throughout ROM  L Shoulder Int Rotation  0-70: T10  L Shoulder Ext Rotation  0-90: T5            Assessment: Body Structures, Functions, Activity Limitations Requiring Skilled Therapeutic Intervention: Increased pain, Decreased ADL status, Decreased ROM, Decreased strength, Decreased high-level IADLs  Assessment: Continued to progress patient through exercises to improve shoulder endurance and strength this date. Patient had good to fair tolerance with occ increases in discomfort when performing prone scap exercises. Education given to patient on exercises to continue to perform at home to reduce soreness. Performed gentle joint mobs to L shoulder with PROM with mild relief per patient. Treatment Diagnosis: L shoulder pain, decreased L shoulder A/PROM, decreased L UE strength, decreased posture/shoulder positioning/biomechanics, and decreased functional activity tolerance  Therapy Prognosis: Excellent       Post-Pain Assessment:       Pain Rating (0-10 pain scale):  4 /10   Location and pain description same as pre-treatment unless indicated. Action: [] NA   [x] Perform HEP  [] Meds as prescribed  [] Modalities as prescribed   [] Call Physician     GOALS   Patient Goal(s): Patient goals :  \"To stop the pain\"    Short Term Goals Completed by 3 weeks Goal Status   STG 1 The pt will demonstrate improved postural awareness requiring <25% VC's with exercises In progress   STG 2 The pt will demo improved L shoulder PROM WNL's all planes in order to increase ease with active ADL's Met     Long Term Goals Completed by 6 weeks Goal Status   LTG 1 The pt will demo improved L shoulder AROM WNL's all planes in order to increase ease with ADL's Met   LTG 2 The pt will demonstrate improved L UE strength 5/5 in order to lift/carry with decreased pain In progress   LTG 3 The pt will have an increase in UEFI score >/=75/80 points in order to increase functional activity tolerance In progress   LTG 4 The pt will be indep/compliant with HEP in order to self-manage symptoms upon D/C In progress     Plan:  Frequency/Duration:  Plan  Plan Frequency: 2-3  Plan weeks: 6  Current Treatment Recommendations: Strengthening, ROM, Manual Therapy - Soft Tissue Mobilization, Neuromuscular re-education, Manual Therapy - Joint Manipulation, Pain management, Home exercise program, Return to work related activity, Patient/Caregiver education & training, Modalities, Positioning, Integrated dry needling  Pt to continue current HEP. See objective section for any therapeutic exercise changes, additions or modifications this date.     Therapy Time:      PT Individual Minutes  Time In: 6067  Time Out: 1892  Minutes: 43  Timed Code Treatment Minutes: 43 Minutes  Procedure Minutes: 0  Timed Activity Minutes Units   Ther Ex 37 2   Manual  6 1     Electronically signed by Walter Oquendo PTA on 9/9/22 at 10:29 AM EDT

## 2022-09-14 ENCOUNTER — HOSPITAL ENCOUNTER (OUTPATIENT)
Dept: PHYSICAL THERAPY | Age: 47
Setting detail: THERAPIES SERIES
Discharge: HOME | End: 2022-09-14
Payer: COMMERCIAL

## 2022-09-14 PROCEDURE — 97110 THERAPEUTIC EXERCISES: CPT

## 2022-09-14 ASSESSMENT — PAIN DESCRIPTION - ORIENTATION: ORIENTATION: LEFT

## 2022-09-14 ASSESSMENT — PAIN DESCRIPTION - DESCRIPTORS: DESCRIPTORS: SORE

## 2022-09-14 ASSESSMENT — PAIN DESCRIPTION - LOCATION: LOCATION: SHOULDER

## 2022-09-14 ASSESSMENT — PAIN SCALES - GENERAL: PAINLEVEL_OUTOF10: 4

## 2022-09-14 NOTE — PROGRESS NOTES
[address] [facility]  Outpatient Physical Therapy    Treatment Note        Date: 2022  Patient: Elza Martinez  : 1975   Confirmed: Yes  MRN: [id number]  Referring Provider: Sherry Riley MD    Medical Diagnosis: Unspecified sprain of unspecified shoulder joint, initial encounter [S45.320B]       Treatment Diagnosis: L shoulder pain, decreased L shoulder A/PROM, decreased L UE strength, decreased posture/shoulder positioning/biomechanics, and decreased functional activity tolerance    Visit Information:  Insurance: Payor: Verneta House / Plan: BCBS - [place] PPO / Product Type: *No Product type* /   PT Visit Information  Onset Date: 22  PT Insurance Information: BCBS  Total # of Visits Approved: 75  Total # of Visits to Date:   No Show: 0  Canceled Appointment: 1  Progress Note Counter: - (per POC)    Subjective Information:  Subjective: 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 \"Its like throbbing when I wake up, I can't get rid of it. \"  HEP Compliance:  [x] Good [] Fair [] Poor [] Reports not doing due to:    Pain Screening  Patient Currently in Pain: Yes  Pain Assessment: 0-10  Pain Level: 4  Pain Location: Shoulder  Pain Orientation: Left  Pain Descriptors: Sore    Treatment:  Exercises:  Exercises  Exercise 1: tband: Rows/Lats GTB x15, IR/ER x15  / T-band ABCs YTB x 1  Exercise 2: 4 way ball stabs x15 ea. Exercise 3: Ball taps 1 kg 3-pt wall tap x 10 ea  Exercise 4: [place] ball bounce 3 x :20s  Exercise 5: prone scap: rows/lats, scap retract, horiz abd IR,ER x10 ea. Exercise 6: Open Book stretch 10 x 5 seconds + lumbar lock  Exercise 8: UBE L 2.5 2. 5'F/2. 5'R  Exercise 9: Modified corner stretch (Lside only, W stretch) 4 x :20  Exercise 13: Valspar square overhead and back x1     Objective Measures:    Strength: [x] NT      ROM: [x] NT      Assessment:    Body Structures, Functions, Activity Limitations Requiring Skilled Therapeutic Intervention: Increased pain, Decreased ADL status, Decreased ROM, Decreased strength, Decreased high-level IADLs  Assessment: Introduced additional overhead exercises this date to focus on improving left shoulder strength and muscular endurance for work related tasks. Patient had no pain throughout but noted fatigue near end of sets. Patient displays occ difficulty witth maintaining proper technique during ball stabilization exerises with weight ball this date. Continued to report no increased pain but increased fatigue in left shoulder at end of therapy. Treatment Diagnosis: L shoulder pain, decreased L shoulder A/PROM, decreased L UE strength, decreased posture/shoulder positioning/biomechanics, and decreased functional activity tolerance  Therapy Prognosis: Excellent         Post-Pain Assessment:       Pain Rating (0-10 pain scale):  3 /10   Location and pain description same as pre-treatment unless indicated. Action: [] NA   [x] Perform HEP  [] Meds as prescribed  [] Modalities as prescribed   [] Call Physician     GOALS   Patient Goal(s): Patient goals :  \"To stop the pain\"    Short Term Goals Completed by 3 weeks Goal Status   STG 1 The pt will demonstrate improved postural awareness requiring <25% VC's with exercises In progress   STG 2 The pt will demo improved L shoulder PROM WNL's all planes in order to increase ease with active ADL's Met     Long Term Goals Completed by 6 weeks Goal Status   LTG 1 The pt will demo improved L shoulder AROM WNL's all planes in order to increase ease with ADL's Met   LTG 2 The pt will demonstrate improved L UE strength 5/5 in order to lift/carry with decreased pain In progress   LTG 3 The pt will have an increase in UEFI score >/=75/80 points in order to increase functional activity tolerance In progress   LTG 4 The pt will be indep/compliant with HEP in order to self-manage symptoms upon D/C In progress     Plan:  Frequency/Duration:  Plan  Plan Frequency: 2-3  Plan weeks: 6  Current Treatment Recommendations: Strengthening, ROM, Manual Therapy - Soft Tissue Mobilization, Neuromuscular re-education, Manual Therapy - Joint Manipulation, Pain management, Home exercise program, Return to work related activity, Patient/Caregiver education & training, Modalities, Positioning, Integrated dry needling  Pt to continue current HEP. See objective section for any therapeutic exercise changes, additions or modifications this date.     Therapy Time:      PT Individual Minutes  Time In: 4867  Time Out: 9429  Minutes: 47  Timed Code Treatment Minutes: 47 Minutes  Procedure Minutes: 0  Timed Activity Minutes Units   Ther Ex 47 3   Electronically signed by Walter Oquendo PTA on 9/14/22 at 3:44 PM EDT

## 2022-09-16 ENCOUNTER — HOSPITAL ENCOUNTER (OUTPATIENT)
Dept: PHYSICAL THERAPY | Age: 47
Setting detail: THERAPIES SERIES
Discharge: HOME | End: 2022-09-16
Payer: COMMERCIAL

## 2022-09-16 PROCEDURE — 97110 THERAPEUTIC EXERCISES: CPT

## 2022-09-16 ASSESSMENT — PAIN DESCRIPTION - PAIN TYPE: TYPE: ACUTE PAIN

## 2022-09-16 ASSESSMENT — PAIN DESCRIPTION - DESCRIPTORS: DESCRIPTORS: SORE;THROBBING

## 2022-09-16 ASSESSMENT — PAIN DESCRIPTION - ORIENTATION: ORIENTATION: LEFT

## 2022-09-16 ASSESSMENT — PAIN DESCRIPTION - LOCATION: LOCATION: SHOULDER

## 2022-09-16 ASSESSMENT — PAIN SCALES - GENERAL: PAINLEVEL_OUTOF10: 4

## 2022-09-16 NOTE — PROGRESS NOTES
[address]  PHYSICAL THERAPY PLAN OF CARE   [address]   [place], [address] [facility]              Phone: [phone]  Fax: [phone]      [] Certification  [] Recertification []  Plan of Care  [] Progress Note [x] Discharge      Referring Provider: Dana Fairchild MD   From:  Hari Cortez, PT,DPT   Patient: Trevor Nissen (50 y.o. male) : 1975 Date: 2022   Medical Diagnosis: Unspecified sprain of unspecified shoulder joint, initial encounter [S47.409Q]    Treatment Diagnosis: L shoulder pain, decreased L shoulder A/PROM, decreased L UE strength, decreased posture/shoulder positioning/biomechanics, and decreased functional activity tolerance    Progress Report Period from:  2022  to 2022    Visits to Date: 18 No Show: 0 Cancelled Appts: 1    OBJECTIVE:   Short Term Goals - Time Frame for Short term goals: 3 weeks    Goals Current/Discharge status  Status   Short term goal 1: The pt will demonstrate improved postural awareness requiring <25% VC's with exercises  Good postural awarness with minimal cues needed to maintain Met   Short term goal 2: The pt will demo improved L shoulder PROM WNL's all planes in order to increase ease with active ADL's  PROM with full ROM Met     Long Term Goals - Time Frame for Long term goals : 6 weeks  Goals Current/ Discharge status Status   Long term goal 1: The pt will demo improved L shoulder AROM WNL's all planes in order to increase ease with ADL's AROM LUE (degrees)  L Shoulder Flexion 0-180: 178  L Shoulder ABduction 0-180: 175  L Shoulder Int Rotation  0-70: T10  L Shoulder Ext Rotation  0-90: T5      Met   Long term goal 2: The pt will demonstrate improved L UE strength 5/5 in order to lift/carry with decreased pain Strength LUE  L Shoulder Flexion: 5/5  L Shoulder ABduction: 4+/5  L Shoulder Internal Rotation: 5/5  L Shoulder External Rotation: 4+/5 (slight discomfort)  L Elbow Flexion: 5/5  L Elbow Extension: 5/5 Partially met Long term goal 3: The pt will have an increase in UEFI score >/=75/80 points in order to increase functional activity tolerance 73/80 UEFI score  Not met   Long term goal 4: The pt will be indep/compliant with HEP in order to self-manage symptoms upon D/C Pt reports good compliance with HEP and understanding. Met     Body Structures, Functions, Activity Limitations Requiring Skilled Therapeutic Intervention: Increased pain, Decreased ADL status, Decreased ROM, Decreased strength, Decreased high-level IADLs  Assessment: Pt with good progress towards above goals nearly meeting all at this time with the exception of L UE strength and UEFI score. However, pt requests D/C at this time and feels he is able to self manage ongoing deficits with HEP indep. D/C further PT at this time with pt to continue HEP. Therapy Prognosis: Excellent      PLAN:   Current Treatment Recommendations: Strengthening, ROM, Manual Therapy - Soft Tissue Mobilization, Neuromuscular re-education, Manual Therapy - Joint Manipulation, Pain management, Home exercise program, Return to work related activity, Patient/Caregiver education & training, Modalities, Positioning, Integrated dry needling  Plan Comment: DC                  Patient Status:[] Continue/ Initiate plan of Care    [x] Discharge PT. Recommend pt continue with HEP. [] Additional visits requested, Please re-certify for additional visits:    [] Hold         Signature: Electronically signed by Irais Rosado PT on 9/16/2022 at 11:56 AM  Objective information taken by: Electronically signed by Thom Guzmán PTA on 9/16/22 at 11:43 AM EDT    If you have any questions or concerns, please don't hesitate to call. Thank you for your referral.    I have reviewed this plan of care and certify a need for medically necessary rehabilitation services.     Physician Signature:__________________________________________________________  Date:  Please sign and return

## 2022-09-16 NOTE — PROGRESS NOTES
[address] [facility]  Outpatient Physical Therapy    Treatment Note        Date: 2022  Patient: Haja Forman  : 1975   Confirmed: Yes  MRN: [id number]  Referring Provider: Ana Robles MD    Medical Diagnosis: Unspecified sprain of unspecified shoulder joint, initial encounter [S4.537M]       Treatment Diagnosis: L shoulder pain, decreased L shoulder A/PROM, decreased L UE strength, decreased posture/shoulder positioning/biomechanics, and decreased functional activity tolerance    Visit Information:  Insurance: Payor: Gary Tiffany / Plan: BCBS - [place] PPO / Product Type: *No Product type* /   PT Visit Information  Onset Date: 22  PT Insurance Information: BCBS  Total # of Visits Approved: 75  Total # of Visits to Date:   No Show: 0  Canceled Appointment: 1  Progress Note Counter: - (per POC)    Subjective Information:  Subjective: Pt expressed disire to be done with therapy and continue working on stretches and exercises on his own at home. Pt continues to reports pain worse in AM after sleeping, improves as the day goes on usally \"not much of anything\" by noon, with some soreness following a work day. 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 HEP Compliance:  [x] Good [] Fair [] Poor [] Reports not doing due to:    Pain Screening  Patient Currently in Pain: Yes  Pain Assessment: 0-10  Pain Level: 4  Pain Type: Acute pain  Pain Location: Shoulder  Pain Orientation: Left  Pain Descriptors: Sore, Throbbing    Treatment:  Exercises:  Exercises  Exercise 2: 4 way ball stabs 1.0 kg x15 ea. Exercise 3: Ball taps 1 kg 3-pt wall tap x 10 ea  Exercise 4: [place] ball bounce 3 x :30s  Exercise 8: UBE L 2.5 2. 5'F/2. 5'R  Exercise 20: HEP: T-band [place]         Objective Measures:        Strength: [] NT  [x] MMT completed:     Strength LUE  L Shoulder Flexion: 5/5  L Shoulder ABduction: 4+/5  L Shoulder Internal Rotation: 5/5  L Shoulder External Rotation: 4+/5 (slight Partially met   LTG 3 The pt will have an increase in UEFI score >/=75/80 points in order to increase functional activity tolerance Partially met   LTG 4 The pt will be indep/compliant with HEP in order to self-manage symptoms upon D/C Met            Plan:  Frequency/Duration:  Plan  Current Treatment Recommendations: Strengthening, ROM, Manual Therapy - Soft Tissue Mobilization, Neuromuscular re-education, Manual Therapy - Joint Manipulation, Pain management, Home exercise program, Return to work related activity, Patient/Caregiver education & training, Modalities, Positioning, Integrated dry needling  Plan Comment: DC  Pt to continue current HEP. See objective section for any therapeutic exercise changes, additions or modifications this date.     Therapy Time:      PT Individual Minutes  Time In: 6819  Time Out: 9551  Minutes: 39  Timed Code Treatment Minutes: 39 Minutes  Procedure Minutes:0  Timed Activity Minutes Units   Ther Ex 39 3     Electronically signed by Deloris Joseph PTA on 9/16/22 at 11:39 AM EDT

## 2022-09-19 ENCOUNTER — HOSPITAL ENCOUNTER (OUTPATIENT)
Dept: PHYSICAL THERAPY | Age: 47
Setting detail: THERAPIES SERIES
End: 2022-09-19
Payer: COMMERCIAL

## 2024-09-16 ENCOUNTER — APPOINTMENT (OUTPATIENT)
Dept: RADIOLOGY | Facility: HOSPITAL | Age: 49
End: 2024-09-16
Payer: MEDICARE

## 2024-09-16 ENCOUNTER — HOSPITAL ENCOUNTER (EMERGENCY)
Facility: HOSPITAL | Age: 49
Discharge: HOME | End: 2024-09-16
Attending: EMERGENCY MEDICINE
Payer: MEDICARE

## 2024-09-16 VITALS
HEIGHT: 71 IN | RESPIRATION RATE: 18 BRPM | HEART RATE: 67 BPM | BODY MASS INDEX: 23.8 KG/M2 | DIASTOLIC BLOOD PRESSURE: 83 MMHG | SYSTOLIC BLOOD PRESSURE: 130 MMHG | WEIGHT: 170 LBS | TEMPERATURE: 97.3 F | OXYGEN SATURATION: 98 %
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
## 2024-09-16 PROCEDURE — 70450 CT HEAD/BRAIN W/O DYE: CPT

## 2024-09-16 PROCEDURE — 99285 EMERGENCY DEPT VISIT HI MDM: CPT | Performed by: EMERGENCY MEDICINE

## 2024-09-16 PROCEDURE — 72125 CT NECK SPINE W/O DYE: CPT

## 2024-09-16 PROCEDURE — 70450 CT HEAD/BRAIN W/O DYE: CPT | Performed by: RADIOLOGY

## 2024-09-16 PROCEDURE — 73130 X-RAY EXAM OF HAND: CPT | Mod: LEFT SIDE | Performed by: RADIOLOGY

## 2024-09-16 PROCEDURE — 73110 X-RAY EXAM OF WRIST: CPT | Mod: LT

## 2024-09-16 PROCEDURE — 72125 CT NECK SPINE W/O DYE: CPT | Performed by: RADIOLOGY

## 2024-09-16 PROCEDURE — 73130 X-RAY EXAM OF HAND: CPT | Mod: RT

## 2024-09-16 PROCEDURE — 73110 X-RAY EXAM OF WRIST: CPT | Mod: LEFT SIDE | Performed by: RADIOLOGY

## 2024-09-16 ASSESSMENT — PAIN DESCRIPTION - PAIN TYPE: TYPE: ACUTE PAIN

## 2024-09-16 ASSESSMENT — LIFESTYLE VARIABLES
TOTAL SCORE: 0
EVER FELT BAD OR GUILTY ABOUT YOUR DRINKING: NO
HAVE PEOPLE ANNOYED YOU BY CRITICIZING YOUR DRINKING: NO
EVER HAD A DRINK FIRST THING IN THE MORNING TO STEADY YOUR NERVES TO GET RID OF A HANGOVER: NO
HAVE YOU EVER FELT YOU SHOULD CUT DOWN ON YOUR DRINKING: NO

## 2024-09-16 ASSESSMENT — COLUMBIA-SUICIDE SEVERITY RATING SCALE - C-SSRS
6. HAVE YOU EVER DONE ANYTHING, STARTED TO DO ANYTHING, OR PREPARED TO DO ANYTHING TO END YOUR LIFE?: NO
2. HAVE YOU ACTUALLY HAD ANY THOUGHTS OF KILLING YOURSELF?: NO
1. IN THE PAST MONTH, HAVE YOU WISHED YOU WERE DEAD OR WISHED YOU COULD GO TO SLEEP AND NOT WAKE UP?: NO

## 2024-09-16 ASSESSMENT — PAIN - FUNCTIONAL ASSESSMENT
PAIN_FUNCTIONAL_ASSESSMENT: 0-10
PAIN_FUNCTIONAL_ASSESSMENT: 0-10

## 2024-09-16 ASSESSMENT — PAIN SCALES - GENERAL
PAINLEVEL_OUTOF10: 0 - NO PAIN
PAINLEVEL_OUTOF10: 6

## 2024-09-16 ASSESSMENT — PAIN DESCRIPTION - LOCATION: LOCATION: NECK
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
## 2024-09-16 NOTE — DISCHARGE INSTRUCTIONS
Please take 600 mg ibuprofen every 8 hours alongside extra strength Tylenol as needed for pain symptoms.  Please follow-up with your PCP and outpatient orthopedics.  Referral provided.  Please return to closest ED if develop any worsening symptoms including severe headache, nausea, vomiting, vision change, numbness/tingling, or loss of function.